# Patient Record
Sex: FEMALE | Race: WHITE
[De-identification: names, ages, dates, MRNs, and addresses within clinical notes are randomized per-mention and may not be internally consistent; named-entity substitution may affect disease eponyms.]

---

## 2019-04-18 NOTE — OR
[facility]
                                    [address], [place]  [address]
_________________________________________________________________________________________
                                                                 Signed   
 
 
DATE OF OPERATION:
04/18/2019
 
SURGEON:
Inocente Corbett MD
 
PREOPERATIVE DIAGNOSIS:
Left upper outer quadrant breast carcinoma (infiltrating ductal, ER/KY positive breast
cancer). 
 
POSTOPERATIVE DIAGNOSIS:
Left upper outer quadrant breast carcinoma (infiltrating ductal, ER/KY positive breast
cancer). 
 
PROCEDURES PERFORMED:
1. Left breast injection of methylene blue dye for sentinel lymph node identification.
2. Left deep axillary sentinel lymph node biopsy.
3. Left wire localized partial mastectomy.
 
ANESTHESIA:
General LMA; Jane Lizbeth, CRNA; and local 10 mL of 0.25% Marcaine with epinephrine.
 
INDICATION:
This 68-year-old  woman underwent screening mammography where she was found to
have an abnormality in the left upper outer quadrant of the breast.  Additional imaging
studies and biopsy by Dr. Gretchen Varma confirmed the infiltrating ductal breast
carcinoma, considered ER/KY positive, HER-2/baldomero is pending.  Her Ki67 was 18% with no
sign of lymphovascular invasion.  She has no clinically palpable lesion and the axilla
is clinically negative.  Chest x-ray is negative as well.  She does have comorbidities
including diabetes type 2 as well as ongoing smoking of half pack of cigarettes a day.
After a thorough review of her options for breast cancer management, she opts for a
breast conservation approach to include lumpectomy (partial mastectomy), axillary
sentinel lymph node biopsy, possible axillary dissection depending on frozen pathology.
She understands as does her daughter the risks of bleeding, infection, recurrent disease
and a plan already in place for radiation therapy postoperatively.  Alternative of
mastectomy has been reviewed with the patient and her daughter as well. 
It is notable that she does have mother who had breast cancer, but no other family
members.  Understanding all these she wished to proceed. 
 
FINDINGS:
Good localization by the wire of the lesion in question was noted.  The lesion was in
the upper outer aspect of the left breast.  Initial excision included the wire
 
    Electronically Signed By: INOCENTE CORBETT MD  04/18/19 1556
_________________________________________________________________________________________
PATIENT NAME:     KIANA ARSHAD                    
MEDICAL RECORD #: [id number]            OPERATIVE REPORT              
          ACCT #: [id number]  
DATE OF BIRTH:   [birth year]            REPORT #: [id number]      
PHYSICIAN:        INOCENTE CORBETT MD                 
PCP:              ANTHONY ANTHONY                  
REPORT IS CONFIDENTIAL AND NOT TO BE RELEASED WITHOUT AUTHORIZATION
 
 
                                  [facility]
                                    [address], [place]  [address]
_________________________________________________________________________________________
                                                                 Signed   
 
 
surrounding soft tissue and palpable abnormality at the tip of the wire.  Palpation of
additional tissue in the distal margin was suspicious.  Therefore, wide additional
excision was undertaken as well.  Specimen radiograph confirmed the initial lesion on
the edge of the excised specimen, concordant to our clinical findings.  I believe the
margin to be widely negative at this point. 
 
As regard to the sentinel lymph node was easily identified with both radionuclide and
methylene blue dye.  Frozen pathology showed no sign of metastatic disease in the lymph
node.  There were no other lymph nodes.  Of note, and only one sentinel lymph node was
excised. 
 
DESCRIPTION OF PROCEDURE:
The patient received from radiology suite, taken the operating room, and given a general
anesthetic by LMA technique.  Preoperative antibiotic Ancef was given.  Sequential
compression device stockings used and heparin subcutaneously administered.  The wire
emanated from the upper outer aspect of the left breast superiorly oriented.  Review of
her films including sentinel lymph node films as well as specimen.  Her as well as the
mammogram films confirmed the lesion and its position in relation to the wire and so on. 
 
1.5 mL of methylene blue dye was injected in the subepithelial space in the upper outer
aspect of the left areola.  After sterile preparation and draping, a C-Trak gamma probe
was applied to the left axilla showing strong uptake in the superior medial aspect.  A
small transverse incision was made there and dissection was carried through the
subcutaneous tissue with electrocautery.  Using blunt dissection in an avascular field,
blue lymphatics were immediately identified and they were followed to the subpectoral
area where a large blue node was noted.  It was approximately 1-1/2 to 2 cm in size.  It
was clearly the index sentinel lymph node.  This was dissected free and lymph node
dissected free and passed for pathology.  Additional interrogation of the axilla showed
no strong uptake to radionuclide monitoring and no obvious blue lymph node elsewhere.
The wound was then packed with gauze awaiting frozen pathology results. 
 
Attention was turned towards the breast area.  The lesion was nonpalpable of course and
hence the wire localization to allow for excision.  A curvilinear incision was made over
the area anticipated to be the site of the lesion in question.  Dissection was carried
through the dermis with electrocautery.  A flap was elevated superiorly to allow for
delivery of localizing wire.  The parenchyma and wire were secured with an Allis clamp
and wide resection undertaken.  The deep lateral aspect of the tip of the wire could be
palpated and excision undertaken.  The explanted breast tissue with wire allowed for
palpation confirming a high probability of lesion in question.  This was sent for
specimen radiograph.  Additional palpation in the area of excision, however, did
demonstrate additional firm tissue and very likely this would have been a positive
margin.  On that basis, wide excision was additionally undertaken in the deep and
 
    Electronically Signed By: INOCENTE CORBETT MD  04/18/19 1556
_________________________________________________________________________________________
PATIENT NAME:     KIANA ARSHAD                    
MEDICAL RECORD #: [id number]            OPERATIVE REPORT              
          ACCT #: [id number]  
DATE OF BIRTH:   [birth year]            REPORT #: [id number]      
PHYSICIAN:        INOCENTE CORBETT MD                 
PCP:              ANTHONY ANTHONY                  
REPORT IS CONFIDENTIAL AND NOT TO BE RELEASED WITHOUT AUTHORIZATION
 
 
                                  [facility]
                                    [address], [place]  [address]
_________________________________________________________________________________________
                                                                 Signed   
 
 
lateral aspects widely excised breast tissue to include the abnormally palpable tissue.
Additional examination showed no suspicious lesion whatsoever.  Irrigation was
undertaken with sterile water.  The parenchyma was reapproximated with interrupted 2-0
Vicryl.  Specimen radiograph confirmed that the lesion was consider (on the edge of the
excision) as was concordant to the clinical findings.  I did not send the additional
excised tissue for specimen radiograph. 
 
By this point, the frozen pathology of the sentinel lymph node showed no evidence of
metastatic disease.  Both wounds were copiously irrigated with sterile water for its
tumor lytic effect and deep soft tissue reapproximated with interrupted 2-0 Vicryl and
running subcuticular 3-0 Vicryl for the skin.  Steri-Strips were applied as was a
Mepilex silver sponge dressing and an OpSite.  The patient was ultimately extubated and 
transferred to recovery in good condition having suffered no complications.  Sponge,
needle, and counts reported as correct x3. 
 
 
 
            ________________________________________
            MD ONESIMO Alatorre/MODL
Job #:  [id number]
DD:  04/18/2019 13:55:29
DT:  04/18/2019 15:45:59
 
cc:            Gretchen Varma MD 
 
               [facility]
 
 
Copies:  GRETCHEN VARMA MD
~
 
 
 
 
 
 
 
 
 
 
    Electronically Signed By: INOCENTE CORBETT MD  04/18/19 1556
_________________________________________________________________________________________
PATIENT NAME:     KIANA ARSHAD                    
MEDICAL RECORD #: [id number]            OPERATIVE REPORT              
          ACCT #: [id number]  
DATE OF BIRTH:   [birth year]            REPORT #: [id number]      
PHYSICIAN:        INOCENTE CORBETT MD                 
PCP:              ANTHONY ANTHONY                  
REPORT IS CONFIDENTIAL AND NOT TO BE RELEASED WITHOUT AUTHORIZATION

## 2019-04-18 NOTE — NUR
PT RESTING IN BED, EYES CLOSED. PT EASILY AWAKENS, DENIES PAIN. ASSESSMENT
COMPLETE.
NO NAUSEA AT
THIS TIME. BOWEL TONES ACTIVE. MEPLIEX DRESSING INTACT, VERY SMALL SCANT
DRAINAGE NOTED TO AXILLARY. PT DENIES ADDITIONAL NEEDS, CALL LIGHT IN REACH.

## 2019-04-18 NOTE — NUR
ASSISTED TO BR VOIDS 150 MLS. FEELS LIGHT HEADED AND THEN BECAME NAUSEATED
VOMITED APPRO 100MLS RED LIQUID. HAD RED JELLO AND WATER 200MLS. RETURNED TO
BED.

## 2019-04-18 NOTE — NUR
PT RESTING IN BED WITH EYES CLOSED. PT RESPONDS TO VERBAL COMMAND AND ANSWERS
QUESTIONS APPROPRIATELY. PT DENIES NAUSEA AND RATES PAIN 4/10 AS COMFORTABLE.
PT FAMILY AT BEDSIDE, CALL LIGHT WITHIN REACH.

## 2019-04-18 NOTE — NUR
CONTS TO FEEL LIGHT HEADED. DENIES NAUSEA. DR CORBETT CALLED WITH UPDATE ON
LIGHT HEADEDINESS NAUSEA. TO OBS PT LONGER AND CALL HIM WITH UPDATE THIS
EVENING.

## 2019-04-18 NOTE — NUR
ASSISTED PT TO THE RESTROOM 2PA WITH RENETTA ROLLE BECAUSE PT REPORTED FEELING
"A LITTLE DIZZY". PT IS BACK IN BED AT THIS TIME. SARIAH IS IN ROOM WITH PT.

## 2019-04-18 NOTE — NUR
DR CORBETT PROVIDED WITH PT UPDATE. PT A/OX4, RATES PAIN 0-2. DENIES NAUSEA,
TOLERATING SUGAR FREE JELLO. BOWEL TONES ACTIVE. RECENT OUTPUT OF 150MLS. PT
1-2PA, REPORTED MILD DIZZINESS/WEAKNESS. VS BEFORE AND AFTER AMBULATION
STABLE. DR CORBETT AWARE OF ALL FINDINGS, PT WILL BE INPATIENT FOR EVENING AND
WILL BE EVALUATED INN THE MORNING BY DR CORBETT. AWAITING NEW ORDERS FOR PT.

## 2019-04-18 NOTE — NUR
REPORT RECEIVED FROM RENETTA VIEIRA. THIS RN ASSUMING CARE OF PT. PT REPORTS 2/10
PAIN AND DENIES NAUSEA AT THIS TIME. DRESSING SHOWS ERASER SIZED RED SPOT NEAR
AXILLA, OTHERWISE, C/D/I. WARM BLANKET PROVIDED. PT WATCHING TV. NO ADDITIONAL
REQUESTS OR COMPLAITNS AT THIS TIME.

## 2019-04-18 NOTE — NUR
THIS RN TO BEDSIDE TO CHECK ON PT. PT DENIES PAIN AND NAUSEA AT THIS TIME. PT
WATCHING TV WITH DAUGHTER. PT TOLREATING SIPS OF WATER. NO ADDTIONAL REQUESTS
OR COMPLAINTS AT THIS TIME. CALL LIGHT WITHIN REACH.

## 2019-04-18 NOTE — NUR
04/18/19 1406 Susana Araujo SN
1344- PT ARRIVES TO PACU. PT ON 6L VIA MASK. 02 SAT IN HIGH 90'S,
RESPS EVEN AND UNLABORED. PT DROWSY AND UNABLE TO RESPOND TO QUESTIONS
AT THIS TIME. ON ARRIVAL PT SBP 86. OZZY, CRNA NOTIFIED OF THIS.
ORDERED TO COMPLETE BOLUS OF FLUID AND START ANOTHER LITER.
 
1351- CBG TAKEN BY RENETTA PITTMAN AT THIS TIME. BLOOD SUGAR .
1358- 02 REMOVED AT THIS TIME. PT ON RA WITH 02 SATS IN HIGH 90'S.
TOLERATING WELL. PT DENIES PAIN, NAUSEA, AND DIZZINESS AT THIS TIME.

## 2019-04-18 NOTE — NUR
CLARIFIED NEW ORDERS WITH DR CORBETT AT NURSE'S STATION. DR CORBETT NOTIFIED THAT
PT TAKES SCHEDULED LANTUS IN THE EVENING AND IN THE EMAR LANTUS IS SCHEDULED
FOR THE MORNING. PER DR CORBETT, "YOU CAN GIVE HER THE LANTUS TONIGHT IF THAT'S
WHEN SHE TAKES IT AT HOME". THIS RN VERIFIED WITH PT AND PT'S DAUGHTER THAT PT
TAKES LANTUS AT NIGHT AND WAS LAST GIVEN ON 4/17/19 AT [place] [phone].
VERBAL ORDER VERIFIED BY DR CORBETT TO CHANGE LANTUS TIME
FOR EVENING STARTING NOW AND TO "HOLD 2AM REGULAR INSULIN AND LET THE LANTUS
TAKE ITS COURSE". NO FURTHER ORDERS.

## 2019-04-18 NOTE — NUR
SHIFT REPORT RECEIVED FROM ELEAZARHIANTONIO BOWLING AT BEDSIDE.PT RESTING IN BED,
RR WNL. MEPILEX IN PLACE, SCANT DRAINAGE NOTED AT AXILLARY, WILL MONITOR. PT
DENIES NAUSEA, SUGAR FREE JELLO GIVEN. PT REPORTS MINIMAL PAIN WITH MOVEMENT,
DENIES NEED FOR PAIN MEDICATION. FAMILY AT BEDSIDE. CALL LIGHT IN REACH.

## 2019-04-18 NOTE — NUR
1435: PATIENT BACK IN DAY SURGERY ROOM. PATIENT DROWSY. AWAKENS TO VOICE.
UNABLE TO RATE PAIN, BUT C/O PAIN. GRIMACING INTERMITTENTLY. VS CHECKED. LEFT
BREAST DRESSING WITH SCANT AMOUNT OF DRAINAGE. SCDs ON ON. IV SITE WNL.
DAUGHTER AT BEDSIDE.
1455: DR. CORBETT NOTIFIED OF PATIENT'S BP. DAUGHTER AND PATIENT REQUESTED
SOMETHING IV FOR PAIN. NEW ORDER RECEIVED FROM DR. CORBETT. PATIENT MEDICATED
FOR PAIN WITH IV MORPHINE. BP RECHECKED.
1540: ASSISTED PATIENT TO EAT JELLO AND DRINK SOME WATER. THEN MEDICATED FOR
PAIN WITH 1 TAB OF PERCOCET. VS CHECKED. FAMILY AT BEDSIDE. BREAST CARE
SPECIALIST IN TO SEE PATIENT AND DAUGHTER. CALL LIGHT WITHIN REACH.

## 2019-04-18 NOTE — EKG
[facility]
                                    [address]
                                  [place]  [address]
_________________________________________________________________________________________
                                                                 Signed   
 
 
Normal sinus rhythm
Left ventricular hypertrophy with repolarization abnormality
Prolonged QT
Abnormal ECG
When compared with ECG of 15-APR-2019 12:00,
T wave inversion no longer evident in Inferior leads
Confirmed by SHANEKA MEDELLIN DO ([id number]) on 4/18/2019 4:32:09 PM
 
 
 
 
 
 
 
 
 
 
 
 
 
 
 
 
 
 
 
 
 
 
 
 
 
 
 
 
 
 
 
 
 
 
 
 
 
 
    Electronically Signed By: SHANEKA MEDELLIN DO  04/18/19 1632
_________________________________________________________________________________________
PATIENT NAME:     KIANA ARSHAD                    
MEDICAL RECORD #: [id number]                     Electrocardiogram             
          ACCT #: [id number]  
DATE OF BIRTH:   [birth year]                                       
PHYSICIAN:   SHANEKA MEDELLIN DO                     REPORT #: [id number]
REPORT IS CONFIDENTIAL AND NOT TO BE RELEASED WITHOUT AUTHORIZATION

## 2019-04-18 NOTE — NUR
1745: PT TO ROOM 117, REPORT RECEIVED FROM YOLIS JACKSON. PT STATES HER PAIN IS
WELL CONTROLLED AT A 2/10. SHE STATES SHE CONTINUES TO FEEL A LITTLE BIT
DIZZY. PT INSTRUCTED TO NOT GET UP WITHOUT HELP AND SHE WAS ORIENTED TO THE
ROOM AND THE USE OF THE CALL BELL. LEFT BREAST SURGICAL DRESSING REMAINS
INTACT WITH A SCANT AMOUNT OF NOTED SHADOWING. SCD'S ON AND RUNNING AT THIS
TIME. 20 GAUGE RIGHT AC IV CDI WITH LR RUNNING AT 85 ML/ HOUR.

## 2019-04-19 NOTE — NUR
DC INSTUCTIONS GIVEN WITH GOOD UNDERSTANDING STATED. PT GETTING DRESSED FOR DC
AT THIS TIME. IV DC'D, TIP INTACT.

## 2019-04-19 NOTE — NUR
PT RESTING IN BED, RR WNL. IV FLUIDS INFUSING PER MD ORDERS, SITE WNL. EYES
CLOSED, NO DISTRESS NOTED. CALL LIGHT IN REACH, PT'S DAUGHTER IN ROOM RESTING
ON COUCH.

## 2019-04-19 NOTE — NUR
PT GETTING READY FOR A SHOWER AT THIS TIME. THE CNA IS IN THE ROOM ASSISTING
THE PT AT THIS TIME. PT DENIES ANY PAIN OR OTHER PROBLEMS.

## 2019-04-19 NOTE — NUR
ASSESSMENT COMPLETE. NO NEW CONCERNS. VSS. PT A/O, REPOSRTS MINIMAL
INTERMITTENT PAIN WITH MOVEMENT, DENIES NEED FOR PHARMACOLOGICAL INTERVENTION.
PT DENIES NAUSEA. IV FLUIDS INFUSING PER MD ORDERS, SITE WNL. PT RECENTLY UP
1PA FROM RESTROOM TO VOID. PT NOW RESTING IN BED, SCD'S IN PLACE. MEPILEX IN
PLACE AND INTACT, NO NEW DRAINAGE NOTED FROM PREVIOUS ASSESSMENT. NO
ADDITIONAL NEEDS, CALL LIGHT IN REACH.

## 2019-04-19 NOTE — NUR
DID PATIENT'S BLOOD SUGAR CHECK FOR BREAKFAST AND LUNCH. GETTING HER SOME ICE
WATER. SHE IS SITTING UP IN HER CHAIR DOING ARM AND LEG EXERCISES. WHILE
WATCHING TV.

## 2019-04-19 NOTE — NUR
HELPED PT TO THE BATHROOM AND BACK TO BED. VITALS DONE AND CHARTED. BEDSIDE
TABLE AND CALL LIGHT IN REACH.

## 2019-04-19 NOTE — NUR
PT HAD UNEVENTFUL NIGHT, SLEPT FOR MOST OF THE SHIFT. PT BECAME INPATIENT
AFTER GIVEN DR CORBETT UPDATE. NO NAUSEA THIS SHIFT, PT ON ADA DIET. SCHEDULED
BS CHECKS WITH INSULIN SS AND SCHEDULED LANTUS. PT SBA WITH AMBULATION. IV
FLUIDS INFUSING, SITE WNL. PT USES CALL LIGHT APPROPERIATELY.

## 2019-04-19 NOTE — NUR
PATIENT SITTING UP IN CHAIR. RN IN ROOM. FINAL VITAL SIGNS WERE OBTAINED PRIOR
TO DISCHARGE FROM THE UNIT

## 2019-04-19 NOTE — NUR
0725: Pt resting in her bed with no complaints of nausea, dizziness, or pain.
Pt alert and oriented and is ordering breakfast at this time. Call light is
within reach.

## 2019-04-19 NOTE — NUR
PER DR CORBETT (SEE PREVIOUS NOTE)
OKAY TO SKIP 0200 INSULIN SS. LAST BS BEFORE LANTUS
ADMINISTRATION . CHARGE RN AWARE OF CONVERSATION BETWEEN THIS RN AND DR CORBETT AND AGREES WITH PLAN TO HOLD 0200 INSULIN SS ORDERS. WILL MONITOR FOR
SIGNS OF HYPER/HYPOGLYCEMIA.

## 2020-05-27 ENCOUNTER — HOSPITAL ENCOUNTER (EMERGENCY)
Dept: HOSPITAL 46 - ED | Age: 70
Discharge: ANOTHER HOSPITAL | End: 2020-05-27
Payer: MEDICARE

## 2020-05-27 VITALS — WEIGHT: 155.01 LBS | BODY MASS INDEX: 28.52 KG/M2 | HEIGHT: 62 IN

## 2020-05-27 DIAGNOSIS — R57.8: ICD-10-CM

## 2020-05-27 DIAGNOSIS — Z79.899: ICD-10-CM

## 2020-05-27 DIAGNOSIS — Z79.82: ICD-10-CM

## 2020-05-27 DIAGNOSIS — F17.200: ICD-10-CM

## 2020-05-27 DIAGNOSIS — K92.2: Primary | ICD-10-CM

## 2020-05-27 DIAGNOSIS — Z79.4: ICD-10-CM

## 2020-05-27 DIAGNOSIS — E11.9: ICD-10-CM

## 2020-05-27 PROCEDURE — P9016 RBC LEUKOCYTES REDUCED: HCPCS

## 2020-05-27 PROCEDURE — C9113 INJ PANTOPRAZOLE SODIUM, VIA: HCPCS

## 2020-05-28 NOTE — EKG
[facility]
                                    [address]
                                  [place]  [address]
_________________________________________________________________________________________
                                                                 Signed   
 
 
Sinus tachycardia
ST \T\ T wave abnormality, consider lateral ischemia Abnormal ECG When compared with ECG
of 18-APR-2019 09:51,
ST more depressed in Anterior leads
T wave inversion no longer evident in Anterior leads
Confirmed by CARLY SUE MD ([id number]) on 5/28/2020 1:12:06 PM
 
 
 
 
 
 
 
 
 
 
 
 
 
 
 
 
 
 
 
 
 
 
 
 
 
 
 
 
 
 
 
 
 
 
 
 
 
 
 
    Electronically Signed By: CARLY SUE MD  05/28/20 1312
_________________________________________________________________________________________
PATIENT NAME:     KIANA ARSHAD                    
MEDICAL RECORD #: [id number]                     Electrocardiogram             
          ACCT #: [id number]  
DATE OF BIRTH:   [birth year]                                       
PHYSICIAN:   CARLY SUE MD           REPORT #: [id number]
REPORT IS CONFIDENTIAL AND NOT TO BE RELEASED WITHOUT AUTHORIZATION

## 2022-04-20 ENCOUNTER — HOSPITAL ENCOUNTER (INPATIENT)
Dept: HOSPITAL 46 - ED | Age: 72
LOS: 16 days | Discharge: HOME | DRG: 393 | End: 2022-05-06
Attending: INTERNAL MEDICINE | Admitting: INTERNAL MEDICINE
Payer: MEDICARE

## 2022-04-20 VITALS — HEIGHT: 62 IN | BODY MASS INDEX: 32.33 KG/M2 | WEIGHT: 175.71 LBS

## 2022-04-20 DIAGNOSIS — R65.10: ICD-10-CM

## 2022-04-20 DIAGNOSIS — K55.039: ICD-10-CM

## 2022-04-20 DIAGNOSIS — F17.200: ICD-10-CM

## 2022-04-20 DIAGNOSIS — E11.9: ICD-10-CM

## 2022-04-20 DIAGNOSIS — Z79.82: ICD-10-CM

## 2022-04-20 DIAGNOSIS — Z85.3: ICD-10-CM

## 2022-04-20 DIAGNOSIS — Z20.822: ICD-10-CM

## 2022-04-20 DIAGNOSIS — K56.7: ICD-10-CM

## 2022-04-20 DIAGNOSIS — I81: ICD-10-CM

## 2022-04-20 DIAGNOSIS — Z90.89: ICD-10-CM

## 2022-04-20 DIAGNOSIS — Z79.899: ICD-10-CM

## 2022-04-20 DIAGNOSIS — K76.6: ICD-10-CM

## 2022-04-20 DIAGNOSIS — K75.4: ICD-10-CM

## 2022-04-20 DIAGNOSIS — K74.60: ICD-10-CM

## 2022-04-20 DIAGNOSIS — Z79.4: ICD-10-CM

## 2022-04-20 DIAGNOSIS — K55.019: Primary | ICD-10-CM

## 2022-04-20 PROCEDURE — P9047 ALBUMIN (HUMAN), 25%, 50ML: HCPCS

## 2022-04-20 PROCEDURE — A9270 NON-COVERED ITEM OR SERVICE: HCPCS

## 2022-04-20 PROCEDURE — C9113 INJ PANTOPRAZOLE SODIUM, VIA: HCPCS

## 2022-04-20 PROCEDURE — 02HV33Z INSERTION OF INFUSION DEVICE INTO SUPERIOR VENA CAVA, PERCUTANEOUS APPROACH: ICD-10-PCS | Performed by: INTERNAL MEDICINE

## 2022-04-20 PROCEDURE — C9803 HOPD COVID-19 SPEC COLLECT: HCPCS

## 2022-04-20 PROCEDURE — C1751 CATH, INF, PER/CENT/MIDLINE: HCPCS

## 2022-04-20 PROCEDURE — U0003 INFECTIOUS AGENT DETECTION BY NUCLEIC ACID (DNA OR RNA); SEVERE ACUTE RESPIRATORY SYNDROME CORONAVIRUS 2 (SARS-COV-2) (CORONAVIRUS DISEASE [COVID-19]), AMPLIFIED PROBE TECHNIQUE, MAKING USE OF HIGH THROUGHPUT TECHNOLOGIES AS DESCRIBED BY CMS-2020-01-R: HCPCS

## 2022-04-20 NOTE — NUR
PT ARRIVED TO THE CCU AT 2320 DROWSY BUT AWOKE EASILY TO VOICE. PT LR BOLUS
INFUSING AS ORDERED. PT MOVED OVER TO THE CCU BED WITH ASSISTANCE FROM RENETTA CARSON AND HOUSE SUPERVISOR GUILLE. VITALS THEN TAKEN, ADMISSION COMPLETED. RENETTA CARSON ADMINISTERED SCHEDULED MEDICATIONS (SEE MAR). PT THEN ASSESSED PT NOTED
TO BE SLOW TO RESPOND, PT HARD OF HEARING AND DOES NOT HAVE HER HEARING AIDS.
PT FORGETFUL AND DROWSY BUT IS ORIENTED X 4. HEART RYTHM TACHYCARDIC BUT
REGULAR, LUNGS CLEAR IN UPPER LOBES, DIMINISHED IN THE BASES BILATERALLY WITH
FINE CRACKLES IN THE LEFT LOWER LOBE. BOWEL TONES HYPOACTIVE, TENDER UPON
PALPATION, PT GUARDING ABDOMEN WHEN PALPATING. RADIAL AND PEDAL PULSES STRONG,
PT DENIES HAVING ANY NUMBNESS OR TINGLING WHEN ASKED. AFTER ASSESSMENT PT
STATED SHE NEEDED TO VOID. PT ASSISSTED UP AND WAS ABLE TO PIVOT TO THE
BEDSIDE COMMODE, VOIDED 600ML. SAMPLE COLLECTED AND SENT TO THE LAB. WHILE ON
THE COMMODE PT BECAME NAUSEAUS, PRN ZOFRAN ADMINISTERED BY RENETTA CARSON. PT ABLE
TO GET BACK INTO BED WITH ASSISTANCE. PT NOW LAYING IN BED SLEEPING AND
REPORTS NO FURTHER NEEDS WHEN ASKED. CALL LIGHT IN REACH, BED IN LOWEST
POSITION, IVF INFUSING AS ORDERED, WILL CONTINUE PLAN OF CARE.

## 2022-04-20 NOTE — EKG
[facility]
                                    [address]
                                  [place]  [address]
_________________________________________________________________________________________
                                                                 Signed   
 
 
Sinus tachycardia
Left ventricular hypertrophy with repolarization abnormality ( Sokolow-Cardozo )
Abnormal ECG
When compared with ECG of 27-MAY-2020 16:29,
T wave inversion now evident in Inferior leads
Confirmed by CARLY SUE MD ([id number]) on 4/20/2022 6:25:25 PM
 
 
 
 
 
 
 
 
 
 
 
 
 
 
 
 
 
 
 
 
 
 
 
 
 
 
 
 
 
 
 
 
 
 
 
 
 
 
 
    Electronically Signed By: CARLY SUE MD  04/20/22 1825
_________________________________________________________________________________________
PATIENT NAME:     KIANA ARSHAD                    
MEDICAL RECORD #: [id number]                     Electrocardiogram             
          ACCT #: [id number]  
DATE OF BIRTH:   [birth year]                                       
PHYSICIAN:   CARLY SUE MD           REPORT #: [id number]
REPORT IS CONFIDENTIAL AND NOT TO BE RELEASED WITHOUT AUTHORIZATION

## 2022-04-20 NOTE — EKG
[facility]
                                    [address]
                                  [place], [place]  [address]
_________________________________________________________________________________________
                                                                 Signed   
 
 
Sinus tachycardia
Left ventricular hypertrophy with repolarization abnormality ( Sokolow-Cardozo )
Abnormal ECG
When compared with ECG of 20-APR-2022 17:08, (Unconfirmed)
No significant change was found
Confirmed by CARLY SUE MD ([id number]) on 4/20/2022 6:25:29 PM
 
 
 
 
 
 
 
 
 
 
 
 
 
 
 
 
 
 
 
 
 
 
 
 
 
 
 
 
 
 
 
 
 
 
 
 
 
 
 
    Electronically Signed By: CARLY SUE MD  04/20/22 1825
_________________________________________________________________________________________
PATIENT NAME:     KIANA ARSHAD                    
MEDICAL RECORD #: [id number]                     Electrocardiogram             
          ACCT #: [id number]  
DATE OF BIRTH:   [birth year]                                       
PHYSICIAN:   CARLY SUE MD           REPORT #: [id number]
REPORT IS CONFIDENTIAL AND NOT TO BE RELEASED WITHOUT AUTHORIZATION

## 2022-04-21 NOTE — NUR
PT LAYING IN ROOM AT THIS TIME AWAKE AND ALERT, DAUGHTER AT THE BEDSIDE, IVF
INFUSING AS ORDERED.  FINISHED DRAWING PT'S LABS AT THIS TIME, BLOOD
BAND VERIFIED WITH . HEPARIN BOLUS OF 5000 UNITS AND HEPARIN DRIP
CALCULATED AT A RATE OF 1100 UNITS/HR DOUBLE VERIFIED WITH RENETTA FORTUNE.
MEDICATIONS THEN ADMINISTERED (SEE MAR). HEPARIN DRIP NOW INFUSING AT 1100
UNITS/HR. PT REPORTS NO NEEDS AT THIS TIME WHEN ASKED AND IS RESTING IN BED
WITH HER DAUGHTER AT THE BEDSIDE. CALL LIGHT IN REACH, BED IN LOWEST POSITION,
WILL CONTINUE PLAN OF CARE.

## 2022-04-21 NOTE — NUR
Spoke with pt, her sister, and friends. Pt consent to questions with
people in room. Pt lives with her daughter, Ariadna, she is at work today.
Pt does not use any DME and has 0 issues acessing her home. Daughter
does all the shopping, cooking, and cleaning. Pt can drive.  Pt plans on
dc to home when cleared medically.

## 2022-04-21 NOTE — NUR
PATIENT RESTING IN BED WITH HER FAMILY AT THE BEDSIDE. PATIENT COMPLAINING OF
PAIN. PER PATIENT THE PAIN MEDICATION GIVEN PRIOR ISNT HELPING HER PAIN.
UPDATED ANA PAULA RN AND ANA PAULA WILL BE IN TO GIVE PRN MORPHINE. PATIENT
AGREEABLE TO PLAN OF CARE. WILL CONTINUE TO CLOSELY MONITOR.

## 2022-04-21 NOTE — NUR
MD IN TO SEE PATIENT. PATIENT IS MORE DISTENDED THIS AM PER MD. PATIENT
REMAINS VERY TENDER TO PALPATION. WILL REPEAT CT SCAN WITH CONTRAST. NO OTHER
NEEDS AT THIS TIME. WILL CONTINUE TO CLOSELY MONITOR.

## 2022-04-21 NOTE — NUR
STUDENT NURSE ARABELLA IN TO GIVE PATIENT A BEDBATH WITH RENETTA GREGORY. SKIN IS
INTACT. PATIENT UP TO CAMMODE WITH STAND-BY ASSIST. PATIENT TOLERATED WELL.
WILL CONTINUE TO CLOSELY MONITOR.

## 2022-04-21 NOTE — NUR
PT LAYING IN BED AWAKE AT THIS TIME, PT ORIENTED X4, IVF INFUSING, IV HEPARIN
INFUSING AT PREVIOUS RATE (SEE MAR). PT VITALS TAKEN AT THIS TIME (SEE CHART).
PT ASSESSMENT THEN COMPLETED. HEART RATE REGULAR, LUNGS CLEAR IN UPPER LOBES
AND CLEAR/DIMINISHED IN THE BASES. ABDOMEN DISTENDED, PAINFUL WHEN PALPATED,
AND GUARDED UPON PALPATION. PT REPORTS 9/10 ABDOMINAL PAIN WHEN ASKED. CMS
INTACT, RADIAL AND PEDAL PULSES STRONG, PT DENIES N&T TO EXTREMITIES.
SCHEDULED MEDICATIONS ADMINISTERED AT THIS TIME ALONG WITH PRN MORPHINE 2MG
FOR ABDOMINAL PAIN (SEE MAR). SLIDING SCALE INSULIN HELD AS CBG WAS
127. PT NOW RESTING IN BED AND REPORTS NO FURTHER NEEDS WHEN ASKED. CALL LIGHT
IN REACH, BED IN LOWEST POSITION, PT'S DAUGHTER AT THE BEDSIDE, WILL CONTINUE
PLAN OF CARE.

## 2022-04-21 NOTE — NUR
PATIENT SHIFT REPORT RECIEVED FROM NIGHT SHIFT RN. PATIENT RESTING IN BED AT
THIS TIME. PER REPORT PATIENT IS PAINFUL ALL OVER. PATIENT HAS ALSO BEEN
TACHYCARDIC IN THE 'S. PATIENT IS GETTING UP AND USING BEDSIDE CAMMODE.
NO OTHER NEEDS AT THIS TIME. WILL CONTINUE TO CLOSELY MONITOR.

## 2022-04-21 NOTE — NUR
PATIENT TOLERATED CT SCAN WELL. PATIENTS DAUGHTER AT THE BEDSIDE. REVIEWED
PLAN OF CARE WITH PATIENT AND HER DAUGHTER. PATIENTS ASSESSMENT ALSO COMPLETED
AT THIS ITME. PATIENTS ABD CONTINUES TO BE DISTENDED AND TENDER. PATIENTS
BOWEL TONES ARE HYPOACTIVE. WILL CONTINUE TO CLOSELY MONITOR PATIENT.

## 2022-04-21 NOTE — NUR
assisted patient in taking her scheduled medications by mouth she did well,
patient appears to be very thirsty, drank almost a whole cup of water. took
her emulose 15 ml well. wants to go back to sleep.

## 2022-04-21 NOTE — NUR
PATIENTS ASSESMENT COMPLETED. PATIENTS BREATH SOUNDS CLEAR, RR RATE EVEN AND
UNLABORED. BOWEL TONES ACTIVE, PATIENT IS VERY TENDER TO PALPATION, MILD
ABD DISTENTION, NO N/V. PATIENT WAS ABLE TO EAT APPROX 50% OF HER CLEAR LIQUID
TRAY. PULSESS GOOD AND STRONG. PATIENT IS GERNALLY WEAK AND ACHY ALL OVER. PT
IS ALERT AND ORIENTED, BUT A LITTLE HARD OF HEARING. MEDICATIONS GIVEN AND
PATIENT TOOK WITH NO ISSUES. AM CARES PROVIDED. CALL LIGHT IN REACH. WILL
CONTINUE TO CLOSELY MONITOR.

## 2022-04-21 NOTE — NUR
MD CORBETT IN THE UNIT. PER MD GIVE A 5000UNIT HEPARIN BOLUS AND START HEPARIN
GTT. UPDATED MD SUE. MD IS AGREEABLE WITH PLAN OF CARE. AWAITED PHARMACY TO
VERIFY MEDS. STARTED GTT PER ORDERS WITH STEPHANIE JACKSON. WILL CONTINUE TO CLOSELY
MONITOR.

## 2022-04-21 NOTE — NUR
PT LAYING IN BED SLEEPING AT THIS TIME IN NO APPARENT DISTRESS. IVF INFUSING
AT ORDERED RATE. RESPIRATIONS EVEN AND UNLABORED, PT IN NO APPARENT DISTRESS
AND WAS LEFT UNDISTURBED. CALL LIGHT IN REACH, BED IN LOWEST POSITION, WILL
CONTINUE PLAN OF CARE.

## 2022-04-21 NOTE — NUR
MD SUE IN TO DISCUSS CT RESULTS WITH PATIENT AND HER DAUGHTER. PLAN OF CARE
REVIEWED WITH BOTH OF THEM. ALL QUESTIONS ANSWERED. PER MD WILL START PATIENT
ON A HEPARIN GTT. PATIENT THEN UP TO BEDSIDE CAMMODE WITH STAND-BY ASSIST AND
TOELRATED WELL. PATIENT RATING ABD PAIN 9/10. PRN OXYCODONE ADMINISTERED. NO
OTHER NEEDS AT THIS TIME. PATIENTS SIST AT THE BEDSIDE AND HER DAUGHTER
STEPPED OUT TO GRAB SOME BELONGINGS TO STAY WITH THE  PATIENT TONIGHT. CALL
LIGHT IN REACH. WILL CONTINUE TO CLOSELY MONITOR.

## 2022-04-21 NOTE — NUR
MEDICATIONS ADMINISTERED INCLUDING 2 MG IV MORPHINE FOR PAIN (SEE EMAR).
FAMIILY AT BEDSIDE. ANSWERED QUESTIONS ABOUT PLAN OF CARE. CALL LIGHT WITHIN
REACH. WILL CONTINUE TO MONITOR.

## 2022-04-21 NOTE — NUR
PT LAYING IN BED SLEEPING AT THIS TIME HEPARIN AND IVF INFUSING AT PREVIOUS
RATES. SCHEDULED IV ABX STARTED AND INFUSING AT ORDERED RATE (SEE MAR). PT
AWOKE DURING THIS TIME. PT ASKED IF SHE WAS HAVING PAIN, PT STATED YES AND
THAT SHE WAS HAVING ABDOMINAL PAIN 8/10. 2MG PRN MORPHINE THEN ADMINISTERED
(SEE MAR). PT REPORTED NO FURTHER NEEDS AT THIS TIME AND RETURNED TO SLEEP. PT
RESTING IN BED, PT'S DAUGHTER IN ROOM SLEEPING IN THE RECLINER, CALL LIGHT IN
REACH, BED IN LOWEST POSITION, WILL CONTINUE PLAN OF CARE.

## 2022-04-22 NOTE — NUR
UPDATED MD ON PATIENTS PAIN CONTROLL. PER PATIENT THE OXYCODONE WORKED WELL
FOR HER ON THE PRIOR DOSE. PATIENT DENIES ANY HALLUCINATIONS SINCE THIS AM.
PER MD WILL CHANGE OXYCODONE TO EVERY 3 HOURS AS NEEDED. WILL CONTINUE TO
CLOSELY MONITOR. MATTHEW JACKSON IS AT PATIENTS BEDSIDE AT THIS TIME TO PLACE MIDLINE.

## 2022-04-22 NOTE — NUR
THIS RN IN WITH PATIENT FOR PAST HOUR. PATIENT NOW RESTING AT THIS TIME.
PATIENT STATES PAIN IS TOLERABLE AT THIS TIME. PATIENT HAS SLEPT MORE TODAY,
BUT IS EASILY WOKEN. WILL CONTINUE TO CLOSELY MONITOR.

## 2022-04-22 NOTE — NUR
PATIENTS APTT IS 71.1. PER ORDER PROTOCOL WILL KEEP HEPARIN GTT AT THE SAME
GTT RATE. VERIFIED WITH PHYLLIS JACKSON.

## 2022-04-22 NOTE — NUR
PATIENT REPOSTITIONED WITH 2 PILLOWS UNDER BUTTOCKS. CALL LIGHT IN EASY REACH.
DAUGHTER STEPPED OUT OF ROOM.

## 2022-04-22 NOTE — NUR
REPORT RECEIVED FROM DAYSHIFT RN, PT RESTING IN BED AWAKE AND ALERT, IVF AND
IV HEPARIN INFUSING AT ORDERED RATES. PT REPORTS NO NEEDS AT THIS TIME WHEN
ASKED, CALL LIGHT IN REACH, BED IN LOWEST POSITION, WILL CONTINUE PLAN OF
CARE.

## 2022-04-22 NOTE — NUR
PATIENT SHIFT ASSESSMENT COMPLETED. PATIENTS BREATH SOUNDS CLEAR AND
DIMINISHED. RR- 12. UNLABORED. OCCASIONAL COUGH NOTED. PATIENT IS ON RA WITH
SPO2 94%. BOWEL TONES HYPOACTIVE. NO BM TODAY. PATIENTS ABD IS TENDER AND
DISTENDED. PATIENT IS AO X4. GEOETN REPORTS SHE IS HAVING HALLUCINATIONS.
STUDENT NURSE AT THE BEDSIDE TO ASSIST PATIENT WITH AM CARE. NO OTHER NEEDS AT
THIS TIME. WILL CONTINUE TO CLOSELY MONITOR.

## 2022-04-22 NOTE — NUR
PT LAYING IN BED AWAKE AND THIS TIME. IVF, HEPARIN DRIP, AND IV ABX INFUSING
AS ORDERED. PT REPORTS NO NEEDS WHEN ASKED AT THIS TIME. SCHEDULED IV FLAGYL
STARTED AS ORDERED AND NOW INFUSING (SEE MAR). 85ML OF URINE OBTAINED FROM
KHAN CATHETER AT THIS TIME STILL CONCENTRATED. PT REPORTS NO NEEDS WHEN ASKED
AND STATES SHE WILL TRY TO GET SOME SLEEP. CALL LIGHT IN REACH, BED IN LOWEST
POSITION, WILL CONTINUE PLAN OF CARE.

## 2022-04-22 NOTE — NUR
PT LAYING IN BED SLEEPING AT THIS TIME IN NO APPARENT DISTRESS. IVF INFUSING
AS ORDERED. HEPARIN DRIP RATE VERIFIED WITH SECOND RN, RATE STARTED AT 1000
UNITS PER TITRATION ORDERS AFTER 30 MINUTE PAUSE. PT IN NO APPARENT DISTRESS
AND REMAINS ASLEEP. RESPIRATIONS EVEN AND UNLABORED. CALL LIGHT IN REACH, WILL
CONTINUE PLAN OF CARE.

## 2022-04-22 NOTE — NUR
PATIENTS PTT IS 75.4. THIS VALUE IS WITHIN THE RANGE THAT DOES NOT NEED
TITRATED AND WILL NOW CHECK PTT IN THE AM SINCE 2 VALUES HAVE NOW BEEN WITHIN
THE PARAMETERES OF NO CHANGES. VERIFIED WITH PHYLLIS JACKSON.

## 2022-04-22 NOTE — NUR
PATIENT RESTING IN BED. PATIENTS DAUGHTER STEPPED OUTSIDE TO WALK. LAB IN AND
LABS WERE COMPLETED. PATIENT TOLERATED WELL. PATIETNS EYES CLEANED. PATIENT
STATES HER PAIN IS BETTER CONTROLLED AT THIS TIME. PATIENT DENIES ANY FURTHER
HALLUCINATIONS. WILL CONTINUE TO CLOSELY MONITOR.

## 2022-04-22 NOTE — NUR
PT'S APTT VALUE OBTAINED AND . PT HEPARIN DRIP STOPPED PER DRIP ORDERS.
DRIP RATE TO BE CONTINUED 30 MINUTES AFTER STOP TIME AT A NEW RATE OF 1000
UNITS/HR PER TITRATION ORDERS. PT LAYING IN BED SLEEPING AT THIS TIME AND
AWOKE EASILY . PT REPORTED HAVING SOME ABDOMINAL PAIN 9/10 AND REQUESTED PRN
PAIN MEDICATION WHEN ASKED. 2MG PRN MORPHINE ADMINISTERED (SEE MAR). PT
REPORTS NO FURTHER NEEDS AND IS NOW SLEEPING. MAINTENANCE IVF INFUSING AS
ORDERED. CALL LIGHT IN REACH,  BED IN LOWEST POSITION, WILL CONTINUE PLAN OF
CARE.

## 2022-04-22 NOTE — NUR
PT LAYING IN BED SLEEPING AT THIS TIME. IVF AND IV HEPARIN INFUSING AT
PREVIOUS RATES. VITALS TAKEN AT THIS TIME, PT AWOKE DURING THIS TIME. PT THEN
ASSESSED. PT SLIGHTLY DROWSY BUT ORIENTED X3 AND FOLLOWS DIRECTIONS. PT IV
SITES C/D/I AND FLUSHING WELL. LUNGS CLEAR IN UPPER LOBES AND DIMINISHED/CLEAR
IN THE BASES BILATERALLY. BOWEL TONES ACTIVE, PAINFUL WHEN PALPATED, MILDLY
DISTENDED, AND GUARDED WHEN PALPATED. PULSES STRONG, PT DENIES NUMBNESS OR
TINGLING TO EXTREMITIES. PT ASKED IF SHE NEEDED TO VOID AT THIS TIME, PT
STATED YES. PT ASSISTED UP AND WAS ABLE TO PIVOT TO THE BEDSIDE COMMODE WITH
SOME SUPPORT. PT VOIDED 350ML OF CONCENTRATED URINE AND WAS ASSISTED BACK INTO
THE BED. PT REPORTS NO FURTHER NEEDS AND IS NOW RESTING IN BED. PHLEBOTOMIST
NOW IN ROOM TO DRAW SCHEDULED LABS, WILL CONTINUE PLAN OF CARE.

## 2022-04-22 NOTE — NUR
PATIENT SHIFT ASSESSMENT COMPLETED AND REMAINS UNCHANGED FROM PRIOR
ASSESSMENT. PATIENT RESTING IN BED WITH HER DAUGHTER VIK AT THE BEDSIDE. BS
CHECKED. WILL CONTINUE TO CLOSELY MONITOR.

## 2022-04-22 NOTE — NUR
THIS RN ASSISTED PATIENT UP TO THE BEDSIDE CAMMMODE. PATIENT IS A STAND-BY
ASSIST AND TOLERATED WELL. NO OTHER NEEDS AT THIS TIME. WILL CONTINUE TO
CLOSELY MONITOR.

## 2022-04-22 NOTE — NUR
MIDLINE INSERTION NOTE
WAS ASKED TO PLACE MIDLINE FOR POOR ACCESS. PT AND PT'S FAMILY NOTIFIED ABOUT
RISKS AND BENEFITS OF A MIDLINE AND ALL PARTIES GAVE APROVAL FOR THE MIDLINE
TO BE PLACED.
PT'S BASILIC AND BRACHIAL VEINS
WERE EXAMINED. THE BASILIC WAS CHOSEN AS IT IS AWAY FROM THE ARTERY. ACCESSED
ON THE FIRST ATTEMPT. RED NONPUSITILE BLOOD WAS RETURNED. PT TOLERATED WELL.

## 2022-04-22 NOTE — NUR
PT ALERT AND ORIENTED LAYING IN BED AT THIS TIME HEPARIN AND IVF INFUSING AT
PREVIOUS RATES. DR. SUE IN AFTER ENTERING THE ROOM TO SPEAK TO THE PT. NEW
ORDERS GIVEN AFTERWARDS TO INCREASE LR TO 150ML/HR AND INSERT A KHNA
CATHETER. CLEAR DIET ORDERS VERIFIED WITH DR. SUE AT THIS TIME AS WELL. PT
STILL RESTING IN BED AWAKE AND ALERT AND WAS INFORMED ON THE KHAN CATHETER
PLACEMENT. VITALS THEN TAKEN AND SCHEDULED MEDICATIONS ADMINISTERED (SEE MAR).
INSULIN NOT GIVEN PER SLIDING SCALE (SEE MAR). PT DENIED THE NEED FOR PAIN
MEDICATION AT THIS TIME WHEN ASKED AND REPORTS NO NAUSEA. RENETTA GUERRA IN AND
ASSISTED WITH PLACING THE KHAN CATHETER, 210 ML OF URINE OBTAINED UPON
INITIAL PLACEMENT THAT WAS OZZY/CLEAR. PT THEN REPOSITIONED UP ON THE BED,
PILLOWS PLACED UNDER PT HIPS. NEW BAG OF HEPARIN STARTED AND INFUSING AT
PREVIOUS RATE OF 1000 UNITS/HR (SEE MAR). ICE WATER PROVIDED TO THE PT. PT
REPORTS NO NEEDS AND IS RESTING IN BED WATCHING TV, CALL LIGHT IN REACH, WILL
CONTINUE PLAN OF CARE.

## 2022-04-22 NOTE — NUR
MD SUE IN TO SEE PATIENT. REVIEWED ASSESSMENT FINDINGS WITH MD AND UPDATED
ABOUT PATIENT REPORTING HALLUCINATIONS. REVIEWED HEPARIN GTT PROTOCOL WITH MD SUE. PER WRITTEN PROTOCOL WILL DO 1 MORE PTT AT 1330. PER WRITTEN PROTOCOL
MUST HAVE 2 CONSECUTIVE PTTS WITHIN NORMAL RANGE TO DO NEXT DRAW IN THE AM.
PATIETNS DAUGHTER AT THE BEDSIDE. REVIEWED PLAN OF CARE WITH BOTH. NO FURTHER
QUESTIONS AT THIS TIME. WILL CONTINUE TO CLOSELY MONITOR.

## 2022-04-22 NOTE — NUR
PATIENT SHIFT REPORT RECIEVED FROM NIGHT SHIFT RN. PATIENT RESTING IN BED.
PATIENTS DAUGHTER AT THE BEDSIDE. WILL CONTINUE TO CLSOELY MONITOR.

## 2022-04-22 NOTE — NUR
PT LAYING IN BED ALERT AND ORIENTED IVF AND HEPARIN DRIP INFUSING AT PREVIOUS
RATES. SCHEDULED IV ABX STARTED AND INFUSING AS ORDERED. VITALS THEN TAKEN AND
PATIENT ASSESSED. HEART RATE REGULAR, MURMUR STILL PRESENT, LUNGS CLEAR IN
UPPER LOBES AND DIMINISHED IN THE BASES BILATERALLY. BOWEL TONES HYPOACTIVE,
ABDOMEN DISTENDED, PT REPORTS PAIN WHEN PALPATING, GUARDING PRESENT WHEN
PALPATING ABDOMEN. PT STATED SHE NEEDED TO VOID, PT ABLE TO STAND UP AND PIVOT
WITH SUPPORT FROM THIS RN. 250ML OF CONCENTRATED URINE VOIDED, PT THEN ASSITED
BACK INTO BED. PT THEN REPORTED NAUSEA AND 9/10 ABDOMINAL PAIN, PRN ZOFRAN AND
2MG PRN MORPHINE ADMINISTERED. PT NOW RESTING IN BED AND REPORTS NO NEEDS,
CALL LIGHT IN REACH, BED IN LOWEST POSITION, PT'S DAUGHTER AT THE BEDSIDE,
WILL CONTINUE PLAN OF CARE.

## 2022-04-22 NOTE — NUR
PT LAYING IN BED SLEEPING AT THIS TIME. IVF, IV ABX, AND HEPARIN INFUSING AS
ORDERED. PT IN NO APPARENT DISTRESS AT THIS TIME, RESPIRATIONS EVEN AND
UNLABORED, PT LEFT UNDISTURBED. CALL LIGHT IN REACH. 50ML URINE OBTAINED FOR
THIS HOUR. NO FURTHER NEEDS ASSESSED AT THIS TIME, WILL CONTINUE PLAN OF CARE.

## 2022-04-23 NOTE — NUR
EVENING MEDS PROVIDED PER ORDER. DISCUSSED PAIN MANAGEMENT WITH FAMILY AND
PATIENT. MAIN COMPLAINT IS LOWER BACK PAIN WHICH PATIENT USES A HEATING PACK
ON AT HOME. K-PAD SET UP FOR PATIENT TO USE ON HER LOWER BACK PRN. PATIENT
DOSE NOT REQUIRE INSULIN COVERAGE THIS EVENING PER SSI ORDER. PATIENT DENIES
NAUSEA AT THIS TIME. ABD IS MODERATELY DISTENDED AND FIRM, TENDER TO TOUCH.
BOWEL SOUNDS HYPOACTIVE. PATIENT REPORTS PASSING GAS EARLIER TODAY. LUNG
SOUNDS ARE CLEAR, DIM IN THE BASES. TOLERATING ROOM AIR. NO EDEMA NOTED IN
LOWER EXTREMITIES. RIGHT ARM MAY HAVE SMALL AMOUNT OF SWELLING, ELEVATED ON A
PILLOW. VS STABLE. KHAN EMPTIED, GOOD OUTPUT. KHAN CARE DONE. LEG BAND
PLACED AND ADJUSTED BY PATIENT'S DAUGHTER. PATIENT REPOSITIONED, HIPS FLOATED
AND HEELS FLOATED. FRESH ICE AND SODA PROVIDED. PATIENT DENIED ANY FURTHER
NEEDS. FAMILY REMAINS AT BEDSIDE. CALL LIGHT IN REACH.

## 2022-04-23 NOTE — NUR
PT LAYING IN BED SLEEPING AT THIS TIME IVF, IV ABX, AND IV HEPARIN INFUSING AT
PREVIOUS RATES. ASSESSMENT COMPLETED AT THIS TIME (SEE CHART). BOWEL TONES
ACTIVE AT THIS TIME THROUGHOUT, ABDOMEN STILL DISTENDED, GAURDED, AND PAINFUL
UPON PALPATION. PT REPORTS PAIN TO NOW BE 8/10 AND REQUESTED PRN PAIN
MEDICATION. IV SITES AND MIDLINE SITE WNL AND FLUSHING WELL. AFTER ASSESSMENT
PRN OXYCODONE PROVIDED (SEE MAR). PT PROVIDED WITH SIPS OF ICE WATER AND
REPORTS NO FURTHER NEEDS AFTERWARDS. PT RESTING IN BED.VITALS TAKEN AND 60ML
OF URINE OBTAINED FOR THE HOUR (SEE CHART). CALL LIGHT IN REACH, BED IN LOWEST
POSITION, WILL CONTINUE PLAN OF CARE.

## 2022-04-23 NOTE — NUR
PT LAYING IN BED AWAKE AND ALERT AT THIS TIME AND ORIENTED X3. IVF AND IV
HEPARIN MOMENTARILY STOPPED. LABS DRAWN FROM MIDLINE AFTER WASTE AND SENT TO
THE LAB. HEPARIN AND FLUIDS THEN RESTARTED, IV ABX STARTED AS WELL AND NOW
INFUSING AT ORDERED RATE (SEE MAR). PTS KHAN EMTPIED AT THIS TIME OF
OZZY/YELLOW CLEAR URINE.
PT'S DAUGHTER ENTERED THE ROOM AND REPORTED THAT THE PT WAS HAVING SOME
HALLUCINATIONS EARLIER AND HAD REPORTED THAT THE CLOCK IN THE ROOM WAS MOVING
BACKWARDS. PT CURRENTLY AWAKE AND ALERT WATCHING TV AND REPORTS NO NEEDS WHEN
ASKED AND IS IN NO APPARENT DISTRESS. CALL LIGHT IN REACH, BED IN LOWEST
POSITION, WILL CONTINUE PLAN OF CARE.

## 2022-04-23 NOTE — NUR
PATIENT'S IV PUMP ALARMING. MORE VOLUME ADDED TO HERPARIN INFUSION; PROGRAMED
DOSE VERIFIED WITH EMAR. IV FLUIDS INFUSING AT 15 ML/HR WITH K+ PHOS INFUSION
AT 85 ML/HR TO TOTAL 100 ML/HR IVF ORDER. PATIENT IS RESTING WITH EYES CLOSED.
FAMILY AT THE BEDSIDE. VS STABLE. IV SITES WNL X3. FAMILY DENIED NEEDS AT THIS
TIME.

## 2022-04-23 NOTE — NUR
ZOFRAN 4 MG IV GIVEN FOR NAUSEA. CLEAR LIQ DIET HELD AT THIS TIME.
REPOSITIONED. K PHOS NOW INFUSING. KCL RIDER COMPLETE. K PHOS AND D5LR
INFUSING AT TOTAL RATE  ML/HR.

## 2022-04-23 NOTE — NUR
PT LAYING IN BED SLEEPING, IV ABX COMPLETED, IVF INFUSING, IV HEPARIN INFUSING
AT PREVIOUS RATES. SP2 MONITOR PLACED BACK ON THE PT, NEW BAG OF MAINTENANCE
IVF STARTED AND NOW INFUSING AT ORDERED RATE. URINE FOR THE HOUR WAS 130MLS.
PT IN NO APPARENT DISTRESS AT THIS TIME, RESPIRATIONS EVEN AND UNLABORED, PT
LEFT UNDISTURBED. CALL LIGHT IN REACH, WILL CONTINUE PLAN OF CARE.

## 2022-04-23 NOTE — NUR
PT LAYING IN BED SLEEPING AT THIS TIME, IVF AND HEPARIN INFUSING AT PREVIOUS
RATES. IV SITES AND MIDLINE SITE C/D/I. SITES FLUSHING EASILY, MIDLINE RETURNS
BLOOD, IVF INFUSING INTO SITE. PT ASSESSMENT COMPLETED (SEE CHART). PT AWOKE
DURING THIS TIME AND WAS ALERT AND ORIENTED. PT REPORTS 7/10 PAIN WHEN
PALPATED, ABDOMEN GUARDED, DISTENDED, BOWEL TONES ACTIVE AT THIS TIME. VITALS
TAKEN AFTER ASSESSMENT AND PRN OXYCODONE ADMINISTERED FOR PAIN (SEE MAR). PT
REPORTS PROVIDED WITH SIPS OF WATER AND REPORTS NO FURTHER NEEDS AT THIS TIME.
PT REMAINS RESTING IN BED, CALL LIGHT IN REACH, BED IN LOWEST POSITION, WILL
CONTINUE PLAN OF CARE.

## 2022-04-23 NOTE — NUR
PT CBG ASSESSED AT THIS TIME AND WAS 78. PT RESTING IN BED AT THIS TIME AND
AWOKE EASILY AND WAS ORIENTED X3. PT PROVIDED WITH JUICE AFTER ASSESSING CBG
. PT REPORTS NO FURTHER NEEDS AND IS NOW RESTING IN BED WATCHING
TV. IVF AND IV HEPARIN INFUSING, CALL LIGHT IN REACH, PT'S DAUGHTER AT THE
BEDSIDE, WILL CONTINUE PLAN OF CARE.

## 2022-04-23 NOTE — NUR
ASSESSMENT DONE. YESSICAEKD WITH PATIENT AND PATIENT DAUGHTER ABOUT POC FRO DAY.
PATIENT IS VERY DROWSY. C/O ABD PAIN.

## 2022-04-23 NOTE — NUR
K RIDER 40 MEQ HUNG  ML/HR AS DIRECTED. IVF OF D5LR HELD WHILE K RIDER
INFUSING. HEPARIN GTT CONTINUES TO INFUSE AT 1000 UNITS/HR, EQUALS 20 ML/HR.
TAKING FEW BITES OF CLEAR LIQUID LUNCH. CONTINUES TO PERIODS OF
NAUSEA. ABD DISTENDED. HOB ELEVATED SLIGHTLY. FAMILY MEMBER IN ROOM.

## 2022-04-23 NOTE — NUR
PT LAYING IN BED SLEEPING, IVF AND HEPARIN INFUSING AT PREVIOUS RATES. 105 ML
OF URINE OBTAINED FOR THE HOUR. PT IN NO APPARENT DISTRESS, RESPIRATIONS
UNLABORED, PT LEFT UNDISTURBED, WILL CONTINUE PLAN OF CARE.

## 2022-04-24 NOTE — NUR
SCHEDULED MED PROVIDED. PT WAKES TO VOICE, NO NEEDS AT THIS TIME. DAUGHTER IN
ROOM. CALL LIGHT IN REACH.

## 2022-04-24 NOTE — NUR
MORNING LABS DRAWN FROM MIDLINE. LAB IN ROOM TO LABEL SAMPLE. PATIENT RESTING
WITH EYES CLOSED. DENIES NEEDS. VS STABLE. KHAN EMPTIED. LAB RESULTS SHOW
THERIPUTIC PTT; NO TITRATION REQUIRED.

## 2022-04-24 NOTE — NUR
PATIENT PROVIDED WITH PRN PAIN MEDS AND NAUSEA MEDS WITH SCHEDULED MEDS. NO
SSI REQUIRED FOR CURRENT BLOOD GLUCOSE. PATIENT IS TOLERATING ROOM AIR. VS
STABLE. ABD IS LARGELY DISTENDED AND FIRM. BOWEL SOUNDS HYPOACTIVE. PATIENT
REPORTS HAVING A BM TODAY WITH NO RELIEF. KHAN CARE DONE, GOOD OUTPUT. IV
FLUIDS AND ABX PER ORDER, SITES WNL X3. LABS DRAWN FROM MIDLINE; BRISK BLOOD
RETURN AND EASY TO FLUSH.

## 2022-04-24 NOTE — NUR
NEW BAG OF HEPARIN STARTED, NO TITRATION. 1000 UNITS/HR. PATIENT LAYING ON
LEFT SIDE WHEN RN ENTERED. ASSISTED PATIENT TO TURN AND LAY ON HER LEFT SIDE
WITH PILLOWS TO PROVIDE SUPPORT. IV SITES WNL X3. IV FLUIDS, ABX AND HEPARIN
INFUSING. PATIENT VS STABLE. LARGE URINE OUTPUT.

## 2022-04-24 NOTE — NUR
aPTT RESULTS BACK , HEPARIN DRIP STOPPED PER PROTOCOL, PLAN TO RESTART
IN THIRTY MINUTES AT DECREASED RATE.

## 2022-04-24 NOTE — NUR
PATIENT REPOSTIIONED UP IN THE BED. VS STABLE. KHAN HAS GOOD OUTPUT. IV
FLUIDS PER ORDER, SITE WNL. PATIENT DENIED ANY FURTHER NEEDS. K-PAD ON LOW
HEAT SETTING.

## 2022-04-24 NOTE — NUR
REPORT RECIEVED. ACCUCHECK-122. DAUGHTER IS IN ROOM. PATIENT C/O ABD
DISCOMFORT. INCREASE WITH MOVEMENT. REPOSITIONED FOR COMFORT. TALKED WITH
PATIENT AND DAUGHTER ABOUT POC OF CARE FOR DAY, DAUGHTER INDICATES
UNDERSTANDING.

## 2022-04-24 NOTE — NUR
UP TO COMMODE WITH ASSIST TO EXPELL LARGE PASTY BROWN STOOL, HEMATEST
NEGATIVE. C/O INCREASED PAIN WITH MOVEMENT. SPONGE BATH GIVEN WHILE UP TO
COMMODE. BACK TO BED W/O INCIDENT.

## 2022-04-24 NOTE — NUR
PATIENT REPOSITIONED IN THE BED. VS STABLE. PATIENT REPORTS PAIN WITH MOVEMENT
AND SOME MILD NAUSEA FROM HER ABD BEING DISTENDED. STATES "ITS LIKE A TIGHT
BAND".

## 2022-04-24 NOTE — NUR
PATIENT RESTING QUIETLY IN BED. OPENS EYES TO VOICE. PATIENT DENIED NEEDS.
REPORTS IMPROVED COMFORT WITH REPOSITIONING AND K-PAD ON HER BACK. IV FLUIDS
PER ORDER, SITE WNL X3. GOOD OUTPUT. VS STABLE.

## 2022-04-24 NOTE — NUR
TOOK CLEAR LIQUID DIET WELL. IS MORE ALERT, SITTING UP IN BED WATCHING TV.
HEPARIN GTT INFUSING  UNITS/HR. IVF AT 80 ML/HR. PATIENT DAUGHTER
REMAINS IN ROOM.

## 2022-04-25 NOTE — NUR
PT LAYING IN BED AWAKE AND ALERT, HEPARIN AND IVF INFUSING AT ORDERED RATES.
VITALS TAKEN AT THIS TIME AND PT. ASSESSED. PT ORIENTED X4, DENIES NUMBNESS OR
TINGLING TO EXTREMTIES, LUNGS CLEAR IN UPPER LOBES AND DIMINISHED IN THE
BASES, HEART REGULAR IN RYTHM, MURMUR PRESENT, HYPOACTIVE BOWEL TONES PRESENT,
ABDOMEN DISTENDED, PAINFUL WHEN PALPATED AND GUARDED. PT REPORTS OVERALL PAIN
AT 7/10 TO HER ABDOMEN AND NARE DUE TO HER NG TUBE THAT IS ON LOW INTERMITTEND
SUCTION WITH BILE PRESENT IN TUBE. PULSES STRONG, MIDLINE SITE C/D/I, FLUSHES
WELL AND RETURNS BLOOD. SCHEDULED MEDICATIONS ADMINISTERED (SEE MAR), INSULIN
HELD PER SLIDING SCALE, MORPHINE ADMINISTERED FOR PAIN, NG TUBE TAKEN OFF
SUCTION AFTER PO MEDICATIONS ADMINISTERED THROUGH NG TUBE. IV ABX NOW INFUSING
AS ORDERED (SEE MAR). PT REMAINS RESTING IN BED AND REPORTS NO NEEDS WHEN
ASKED. KHAN EMPTIED OF 140ML OF URINE. WILL CONTINUE PLAN OF CARE. CALL LIGHT
IN REACH.

## 2022-04-25 NOTE — NUR
PATIENT RESTING QUIETLY IN BED. SISTER IN ROOM AT THIS TIME. VITALS CHARTED,
CALL LIGHT IN EASY REACH

## 2022-04-25 NOTE — NUR
ORAL CONTRAST GIVEN AS PATIENT TO HAVE CT OF ABD THIS MORNING. ASSESSMENT
DONE. HEPARIN GTT INFUSING  UNITS HR. TALKED WITH PATIENT ABOUT POC FOR
DAY, WILL REINFORCETHIS THROUGH DAY.

## 2022-04-25 NOTE — NUR
PATIENT APPEARS TO BE SLEEPING SOUNDLY. VS STABLE. KHAN HAS GOOD URINE
OUTPUT. SLIGHTLY OZZY IN COLOR. IV FLUIDS AND HEPARIN INFUSING PER ORDER.
SITE WNL X2. FAMILY AT BEDSIDE.

## 2022-04-25 NOTE — NUR
NEW BAG IV FLUIDS STARTED. PATIENT APPEARS TO BE RESTING IWHT EYES CLOSED.
FAMILY IN ROOM ALSO RESTING. VS STABLE. KHAN DRAINING FREELY. IV SITES WNL
X3.

## 2022-04-25 NOTE — NUR
PT LAYING IN BED SLEEPING AT THIS TIME, IVF, IV HEPARIN, AND IV ABX INFUSING
AS ORDERED. PT IN NO APPARENT DISTRESS, NG TUBE STILL CLAMPED AT THIS TIME.
SCHEDULED IV ABX STARTED AND NOW INFUSING AT ORDERED RATE. PT LEFT
UNDISTURBED, NO APPARENT NEEDS NOTED AT THIS TIME, WILL CONTINUE PLAN OF CARE.
CALL LIGHT IN REACH.

## 2022-04-25 NOTE — NUR
ASSISTED LAB IN DRAWING MRONING LABS. PATIENT REPORTS SOME PAIN IN ABD BUT NOT
UNBEARABLE. PATIENT UP TO THE CHAIR. ASSISTED WITH MORNING CARES. ABD IS TIGHT
AND TENDER. KHAN EMPTIED. IV FLUIDS PER ORDER, SITES WNL.

## 2022-04-25 NOTE — NUR
PT LAYING IN BED SLEEPING AT THIS TIME IN NO APPARENT DISTRESS. NG TUBE
CONNECTED TO LOW INTERMITTENT SUCTION AT THIS TIME. URINE OUTPUT FOR THE LAST
TWO HOURS WAS 60ML, URINE CONCENTRATED AT THIS TIME. IVF, HEPARIN, AND IV ABX
INFUSING AT ORDERED RATES. NO NEEDS NOTED AT THIS TIME, PT LEFT UNDISTURBED
AND REMAINS SLEEPING, CALL LIGHT IN REACH, WILL CONTINUE PLAN OF CARE.

## 2022-04-26 NOTE — NUR
PT SLEEPING IN BED AT THIS TIME IN NO APPARENT DISTRESS. D5LR INCREASED TO
125ML/HR, 250ML BOLUS OF LR STARTED (SEE MAR). PT REMIANS ASLEEP AND WAS LEFT
UNDISTURBED, WILL CONTINUE PLAN OF CARE. CALL LIGHT IN REACH.

## 2022-04-26 NOTE — NUR
PT RESTING IN BED AT THIS TIME AWAKE, IVF AND HEPARIN INFUSING AT ORDERED
RATES, NG TUBE ON AT LIS. BILE PRESENT IN TUBING. PT REPORTS NO NEEDS AT THIS
TIME WHEN ASKED. SCHEDULED LABS DRAWN FROM MIDLINE AT THIS TIME AFTER
MOMENTARILY STOPPING FLUIDS AND WASTING. LABS SENT, PT REPORTS NO NEEDS, CALL
LIGHT IN REACH, BED IN LOWEST POSITION, WILL CONTINUE PLAN OF CARE.

## 2022-04-26 NOTE — NUR
Spoke with pt and her daughter Ariadna.  They have been discussing needs for
when pt discharges. Pt lives with daughter and granddaughter. Granddaughter
has a state paid cg, her boyfriend.  Per Ariadna he feels he could also care for
Lis. Ariadna is considering taking time off, we discussed FMLA. Pt is able
to walk in the room. We also discussed HH and OP therapy at the [facility]. Let them know I will assist anyway I can when she gets closer to dc.

## 2022-04-26 NOTE — NUR
PATIENT RESTING IN BED, VITALS AND I&OS CHARTED. KHAN EMPTIED. CALL LIGHT IN
EASY REACH, NO OTHER NEEDS AT THIS TIME

## 2022-04-26 NOTE — NUR
PT RESTING IN BED SLEEPING AT THIS TIME NG TUBE STILL CLAMPED. IVF AND IV
HEPARIN INFUSING AT ORDERED RATE. IV ALBUMIN COMPLETED, FLAGYL STARTED AND
INFUSING AT ORDERED RATE (SEE MAR). PT REMAINS ASLEEP AT THIS TIME AND WAS
LEFT UNDISTURBED, CALL LIGHT IN REACH, WILL CONTINUE PLAN OF CARE.

## 2022-04-26 NOTE — NUR
FLAGYL COMPLETED. MIDLINE ASSESSED, BRISK BLOOD RETURN. SL. pt RESTING IN BED
WITH EYES CLOSED, RESPIRATIONS REGULAR. CALL LIGHT WITHIN REACH.

## 2022-04-26 NOTE — NUR
PT RESTING IN BED AWAKE AND ALERT IVF AND IV HEPARIN INFUSING AT ORDERED RATE.
PT REQUESTS A PRN LOZENGE AT THIS TIME. VITALS TAKEN AT THIS TIME. NG TUBE ON
LOW INTERMITTENT SUCTION, GREEN BILE PRESENT IN TUBING. PT TAKEN OFF OF
SUCTION PRIOR TO MEDICATION ADMINISTRATION. 40ML WATER FLUSH USED AT THIS
TIME.
SCHEDULED MEDICATION ADMINISTERED ALONG WITH PRN CEPACOL LOZENGE (SEE MAR).
INSULIN HELD PER SLIDING SCALE (SEE MAR). PT DENIES THE NEED FOR PAIN
MEDICATION AT THIS TIME. ASSESSMENT COMPLETED AT THIS TIME. PT ALERT AND
ORIENTED, MURMUR PRESENT, RYTHM REGULAR, LUNGS CLEAR IN UPPER LOBES,
DIMINISHED IN THE BASES, BOWEL TONES RARE AT THIS TIME. ABDOMEN DISTENDED AND
GUARDED. AFTER ASSESSMENT PT REPORTS NO FURTHER NEEDS AND IS NOW RESTING IN
BED, WARM BLANKETS PROVIDED TO PT. NEW ORDERS GIVEN BY DR. SUE TO DECREASE
THE HEPARIN DRIP  UNITS/HR, DRIP TITRATED DOWN TO ORDERED RATE. PT
REPORTS NO FURTHER NEEDS, WILL CONTINUE PLAN OF CARE. CALL LIGHT IN REACH, BED
IN LOWEST POSITION.

## 2022-04-26 NOTE — NUR
PT LAYING IN BED SLEEPING AT THIS TIME, IVF AND HEPARIN INFUSING AS ORDERED.
NEW BAG OF IVF STARTED ALONG WITH SCHEDULED IV ABX (SEE MAR). 28ML OF URINE
OBTAINED FOR THE HOUR. NG TUBE STILL ON AT LOW INTERMITTENT SUCTION, BILE
DRAINING, 150ML PRESENT IN THE CONTAINER FOR THE SHIFT. PT IN NO APPARENT
DISTRESS AT THIS TIME AND WAS LEFT UNDISTURBED AND REMAINS ASLEEP. NO FURTHER
NEEDS ASSESSED AT THIS TIME, WILL CONTINUE PLAN OF CARE. CALL LIGHT IN REACH,
BED IN LOWEST POSITION.

## 2022-04-26 NOTE — NUR
PT LAYING IN BED SLEEPING AT THIS TIME, NG ON LOW INTERMITTENT SUCTION, IVF,
IV ABX, AND IV HEPARIN INFUSING AT PREVIOUS RATES. ASSESSMENT COMPLETED AT
THIS TIME, NO CHANGES NOTED AT THIS TIME. PT IN NO APPARENT DISTRESS AND
REMAINS SLEEPING IN THE BED. PT'S DAUGHTER AT THE BEDSIDE RECLINER SLEEPING AS
WELL. NO FURTHER NEEDS ASSESSED AT THIS TIME, WILL CONTINUE PLAN OF CARE.

## 2022-04-26 NOTE — NUR
PT LAYING IN BED SLEEPING AT THIS TIME, IVF AND HEPARIN INFUSING AS ORDERED.
PT AWOKE AT THIS TIME AND WAS ALERT AND ORIENTED. VITALS TAKEN AND ASSESSMENT
COMPLETED (SEE CHART). ABDOMEN UNCHANGED, STILL DISTENDED, BOWEL TONES
HYPOACTIVE AT THIS TIME. PT REPORTS 7-8/10 PAIN IN HER ABDOMEN AND NARE FROM
THE NG TUBE, PRN MORPHINE ADMINISTERED (SEE MAR). ICE CHIPS THEN PROVIDED TO
PATIENT AND CONSENT FOR CENTRAL LINE SIGNED AT THIS TIME. PT REPORTS NO
FURTHER NEEDS AT THIS TIME AND IS RESTING IN BED, WILL CONTINUE PLAN OF CARE.
CALL LIGHT IN REACH, BED IN LOWEST POSTIION.

## 2022-04-26 NOTE — NUR
DR. SUE HERE TO SEE PATIENT. ORDERS RECIEVED. PATIENT IS SITTNG UP IN CHAIR.
DAUGHTER IS IN ROOM. NG CLAMPED AS MEDS GIVEN PER NGT.

## 2022-04-26 NOTE — NUR
BEDBATH COMPLETE, NEW GOWN AND LINENS PROVIDED. NG IN PLACE. PATIENT UP TO
CHAIR. DAUGHTER ASSISTING WITH CARES. CALL LIGHT IN EASY REACH

## 2022-04-26 NOTE — NUR
PT LAYING IN BED SLEEPING ON THE BIPAP AT THIS TIME AT PREVIOUS SETTINGS, IVF
INFUSING AS ORDERED. SCHEDULED MAGNESIUM BAG 2 OF 2 STARTED AND NOW INFUSING
AT ORDERED RATE.  CBG ASSESSED AND  MG/DL AT THIS TIME. PT AWOKE
MOMENTARILY PRIOR TO CBG CHECK AND IS STILL DROWSY AND RETURNED TO SLEEP. NO
FURTHER NEEDS ASSESSED AT THIS TIME, WILL CONTINUE PLAN OF CARE.

## 2022-04-26 NOTE — NUR
PATIENT RESTING IN BED, EYES CLOSED. DAUGHTER IN ROOM- CAREGIVER TRAY ORDERED.
KHAN EMPTIED, VITALS AND I&OS CHARTED. NO OTHER NEEDS AT THIS TIME

## 2022-04-26 NOTE — NUR
DR. SUE UPDATED ON PT AT THIS TIME, DECREASING URINE OUTPUT OF 20ML/HR IN
LAST TWO HOURS REPORTED. NEW ORDERS GIVEN TO INCREASE D5LR , ADMINISTER
A 250 ML BOLUS OF LR OVER 1 HOUR, AND TO NOTIFY IN URINE OUTPUT IS UNDER
25ML/HR FOR THE NEXT TWO HOURS. WILL CONITNUE PLAN OF CARE.

## 2022-04-27 NOTE — NUR
PT LAYING IN BED AT THIS TIME SLEEPING, IVF AND HEPARIN INFUSING AT ORDERED
RATES. PT AWOKE EASILY AND WAS ORIENTED X3, VITALS TAKEN (SEE CHART). PT HAD
COMPLAINTS OF NARE PAIN, SLIGHT ABDOMINAL PAIN, AND LEFT NARE PAIN. PRN
CEPACOL PROVIDED (SEE MAR). PT NG FLUSHED AFTERWARDS AND REMAINS ON LIS.
ASSESSMENT THEN COMPLETED. MURMUR PRESENT, FINE CRACKLES HEARD IN UPPER LOBES
BILATERALLY AND DIMINISHED WITH FINE CRACKLES IN THE BASES. PT ENCOURAGED TO
USE THE I.S AND DEEP BREATH/COUGH. ABDOMEN DISTENDED, HYPOACTIVE TONES
PRESENT, ABDOMEN GUARDED, PAINFUL WHEN PALPATED. AFTER ASSESSMENT PT REQUESTED
PRN PAIN MEDICATION, 2MG PRN MORPHINE ADMINISTERED FOR NARE PAIN AND ABDOMINAL
PAIN. PT REPOSITIONED ON THE BED AFTERWARDS, PILLOWS PLACED UNDER RIGHT SIDE
FOR COMFORT. NO FURTHER NEEDS REPORTED.
WILL CONTINUE PLAN OF CARE. CALL LIGHT IN
REACH, BED IN LOWEST POSITION.

## 2022-04-27 NOTE — NUR
PATIENT REPOSITIONED. DENIES PAIN. VS STABLE. KHAN DRAINING FREELY, OUTPUT
QS. PICC LINE WNL. NO CONCERNS AT THIS TIME. CALL LIGHT IN REACH.

## 2022-04-27 NOTE — NUR
PICC INSERTION NOTE
ORDERS RECIEVED TO EVALUATE KIANA FOR POSSIBLE PICC INSERTION DUE TO HER
NEED FOR TPN. HER LEFT ARM IS EXCLUDED AS A SITE DUE TO RESTRICTED EXTREMITY
STATUS. SHE IS ON A HEPARIN DRIP WHICH MAKES A EJ OR IJ APPROACH A HIGH RISK
PROCEDURE. SHE CURENTLY HAS A 4 FR 10CM MIDLINE IN PLACE IN HER RIGHT BASILIC
VEIN WHICH IS THE ONLY SUITABLE VEIN IN HER RIGHT ARM. AFTER REVIEWING THE
CHART AND DISCUSSING THE PLAN WITH THE MD, CCU RN, AND OTHER PICC NURSES, WE
ARE ELECTING TO EXCHANGE HER MIDLINE WITH A CENTRAL LINE. THE RISK AND
COMPLICATIONS OF THE PROCEDURE ARE DISCUSSED WITH THE PATIENT AND THE RISK FOR
INFECTION AND BLEEDING ARE HIGHLIGHTED FOR THE PATIENT. INFORMED CONSENT WAS
SIGNED PRIOR TO THE START OF THE PROCEDURE.
THE RIGHT ARM WAS COPIOUSLY CLEANED AND PREPPED AND THE MIDLINE WAS WITHDRAWN
5CM. THE PATIENT WAS DRAPED AND THE MIDLINE WAS STABILIZED AND CUT WITH 5CM
EXPOSED AND 5CM REMAINING IN THE PATIENT. BRISK DARK RED NONPULSATILE BLEEDING
WAS SEEN FLOWING FROM THE LINE. A GUIDEWIRE WAS PASSED UP THE MIDLINE AND THE
MIDLINE REMOVED. LOCAL ANESTHETIC WAS USED AND THE INTRODUCER WAS PASSED OVER
THE GUIDEWIRE. AN ATTEMPT WAS MADE TO DILATE THE VESSEL BUT SCAR TISSUE WAS
MAKING THIS IMPOSIBLE. AN 11 BLADE WAS USED TO OPEN A 0.5CM AREA ALONG THE
WIRE AND THEN THE INTRODUCER PASSED EASILY UP THE ARM. THE GUIDWIRE WAS
REMOVED AND THERE WERE NO PROBLEMS ADVANCING THE PICC. MAGNETIC TRACKING AND
3CG WAS USED TO CONFIRM THE PICC WAS IN A CENTRAL LOCATION. A STERILE DRESSING
WAS APPLIED AND A NONSTERILE LIGHT PRESSURE DRESSING WAS APPLIED OVER THE AREA
TO MINIMIZE BLEEDING AND BRUSING. A CXR WAS TAKEN AND WE AWAIT THE RESULTS OF
THIS PRIOR TO USING THE LINE.

## 2022-04-27 NOTE — NUR
DR. SUE UPDATED ON PT'S APTT AND OUTPUT, ORDERS GIVEN TO MAINTAIN HEPARIN
DRIP  UNITS/HR, WILL CONTINUE PLAN OF CARE.

## 2022-04-27 NOTE — NUR
PT LAYING IN BED SLEEPING AT THIS TIME. IVF AND HEPARIN INFUSING AS ORDERED,
NG TUBE ON AT LIS. SCHEDULED IV FLAGYL STARTED AND NOW INFUSING, NO FURTHER
NEEDS ASSESSED AT THIS TIME. 250ML OF CLEAR/YELLOW BILE IN CANNISTER FOR THE
SHIFT. CALL LIGHT IN REACH, BED IN LOWEST POSITION, WILL CONTINUE PLAN OF
CARE.

## 2022-04-27 NOTE — NUR
PATIENT PROVIDED WITH EVENING MEDS AND PRN LOZENGES. PATIENT REPOSITIONED IN
BED. DENIES ANY PAIN OR NAUSEA. VS STABLE. PICC LINE INFUSING HEPARIN AND
TPN/LIPIDS. PERIPHERAL SITE ESTABLISHED FOR IV ABX INFUSING. KHAN EMPTIED.
PATIENT PROVIDED ICE CHIPS. DENIES ANY OTHER NEEDS. CALL LIGHT IN REACH.

## 2022-04-27 NOTE — NUR
resting after po medications given. albumin hung. ASSESSMENT DONE. NGT TO LIS.
NOW CLAMPED AS MEDS GIVEN. DENIES ABD PAIN AT THIS TIME. TALKED WITH PATIENT
ABOUT POC FOR DAY, INDICATES UNDERSTANDING. [employer] PATENT.

## 2022-04-27 NOTE — NUR
PATEINT RECLINED IN RECLINER, LEGS ELEVATED. VISITORS IN ROOM. VITALS AND I&OS
CHARTED. KHAN EMPTIED

## 2022-04-27 NOTE — NUR
PATIENT IN SHOWER WITH RN PRADEEP ASSISTING. LINENS CHANGED. PATIENT UP IN CHAIR
AT THIS TIME. CALL LIGHT IN EASY REAH

## 2022-04-27 NOTE — NUR
PT LAYING IN BED SLEEPING, IV PUMP ALARMING, IV SITE WNL, FLUSHES WELL, FLUIDS
RESTARTED. HEPARIN STILL INFUSING AS ORDERED. PT AWOKE AT THIS TIME AND STATED
HER THROAT WAS FEELING SOAR AND ASKED FOR A PRN CEPACOL LOZENGE. PT ORIENTED
X4 AT THIS TIME WHEN ASKED. PRN CEPACOL ADMINISTERED, PT REPORTS NO FURTHER
NEEDS AT THIS TIME, CALL LIGHT IN REACH, BED IN LOWEST POSTION, WILL CONTINUE
PLAN OF CARE.

## 2022-04-27 NOTE — NUR
PT LAYING IN BED SLEEPING AT THIS TIME, IVF INFUSING AS ORDERED. PT AWOKE
EASILY AND WAS ORIENTED. NG TUBE PUT BACK ON LIS AT THIS TIME AFTER A 40ML
WATER FLUSH. NEW BAG OF D5LR STARTED AT ORDERED RATE. PT REPORTS THROAT
SORENESS, DENIES THE NEED FOR PAIN MEDICATION BUT REQUESTS PRN CEPACOL
LOZENGE. PRN CEPACOL LOZENGE PROVIDED. VITALS AND ASSESSMENT COMPLETED, NO
CHANGES NOTED. ABDOMEN SEVERELY DISTENED, GUARDED, BOWEL TONES RARE, PAINFUL
WHEN PALPATED. PT REPORTS NO FURTHER NEEDS AT THIS TIME AND WAS REPOSITIONED
IN BED, PILLOWS PLACED UNDER LEFT HIP, CALL LIGHT IN REACH, BED IN LOWEST
POSITION, WILL CONTINUE PLAN OF CARE.

## 2022-04-28 NOTE — NUR
JASSI JACKSON SPOKE WITH MD CORBETT AND PER MD CORBETT DO NOT GIVE VIT. K THAT IS
SCHEDULED FOR MONDAY 5/2/2022. THIS RN WILL DC MEDICATION.

## 2022-04-28 NOTE — NUR
LIPID INFUSION FINISHED. LINE DISCONNECTED. PATIENT RESTING IN BED. REPORTS
HAVING MOVES HERSELF IN THE BED AND CURRENTLY FEELING COMFORTABLE. DOES
REPORT THAT SHE HAS HAD SOME PAIN WITH THE BP CUFF ON HER RIGHT LEG. CUFF
REMOVED TO ALLOW FOR A BREAK. PATIENT OTHERWISE FEELS WELL. NO PAIN OR NAUSEA.
BOWEL SOUNDS ACTIVE, ABD MILD DISTENSION. LUNG SOUNDS ARE CLEAR AND DIM,
TOLERATING ROOM AIR. OCCATIONALLY USING IS WHILE AWAKE. HEPARIN AND TPN
INFUSING PER ORDER INTO PICC LINE, SITE WNL. ENCOURAGED PATIENT TO REST AND
MADE PLAN TO GET HER TO THE CHAIR PRIOR TO DAYSHIFT ARRIVAL. CALL LIGHT IN
REACH.

## 2022-04-28 NOTE — NUR
PATIENT UP TO THE BATHROOM FOR BM. ESTIMATED MEDIUM BM, PARTIALLY LIQUID AND
GREEN. PATIENT AMBULATED INTO THE BATHROOM WITH 1PA. PATIENT MOVES SLOWLY BUT
APPEARS STEADY. PATIENT DENIED LIGHTHEADEDNESS. PATIENT RETURNED TO BED.
SCHEDULED MEDS PROVIDED. ACCU CHECK AND SSI PER ORDERS. PATIENT DENIES NAUSEA
OR PAIN. VS STABLE. FRESH ICE WATER AND ICE CHIPS PROVIDED. IV ABX IN
PERIPHERAL IV. HEPARIN AND TPN IN 2 LUMEN PICC. HEPARIN LINE FLUSHED WITH 10
MLS AND RETURNS BLOOD. LUNG SOUNDS ARE CLEAR WITH FINE CRACKLES IN THE BASES.
PATIENT USING IS REGULARLY. KHAN CARE DONE, OUTPUT QS. URINE COLOR IMPROVED.
CALL LIGHT IN REACH. LIGHTS DIMMED.

## 2022-04-28 NOTE — NUR
PATIENT IS UP SITTING IN THE CHAIR. HEPARIN GTT CONTINUES AT THIS TIME. WILL
REDRAW PTT THIS AFTERNOON. NO OTHER NEEDS AT THIS TIME. WILL CONTINUE TO
CLOSELY MONITOR.

## 2022-04-28 NOTE — NUR
THIS RN IN TO EMPTY PATIENTS KHAN CATHETER. PATIENT RESTING IN BED AT THIS
TIME. CALL LIGHT IS IN REACH. PATIENT DENIES ANY NEEDS. WILL CONTINUE TO
CLOSELY MONITOR.

## 2022-04-28 NOTE — NUR
DISCUSSED APTT RESULTS WITH DR. SUE. ORDERS TO INCREASE HEPARIN DRIP BY 50
UNITS AND RECHECK IN 6 HOURS. THIS DONE AND VERIFIED WITH STEPHANIE JACKSON.

## 2022-04-28 NOTE — NUR
PATIENT RESTING IN BED. PATIENT REPORT RECIEVED FROM NIGHT SHIFT RN. PATIENT
HAS CALL LIGHT AND CALLS APPROPRIATELY. WILL CONTINUE TO CLOSELY MONITOR.

## 2022-04-28 NOTE — NUR
PATIENT ASSESSMENT REMAINS UNCHANGED FROM PRIOR ASSESSMENTS. PATIENTS TPN
GIVEN. PATIENTS BLOOD SUGAR DONE AND INSULIN GIVEN PER ORDERS. PATIENT DENIES
ANY OTHER NEEDS AT THIS TIME. WILL CONTINUE TO CLOSELY MONITOR.

## 2022-04-28 NOTE — NUR
PATIENTS ASSESSMENT COMPLETED AND REMAINS UNCHAGED FROM PRIOR ASSESSMENT.
PATIENT REPOSITION IN THE CHAIR WITH TWO TN ASSIST. PATIENT TOLERATED WELL.
PATIENT DENIES ANY OTHER NEEDS AT THIS TIME. WILL CONTINUE TO CLOSELY MONITOR.

## 2022-04-28 NOTE — NUR
PATIENT SITTING UP IN RECLINER. VITALS AND I&OS CHARTED. KHAN EMPTIED AND
PATIENT BRUSHING TEETH AT THIS TIME. LINENS CHANGED AND GARBAGE EMPTIED. CALL
LIGHT IN EASY REACH, RN IN ROOM.

## 2022-04-28 NOTE — NUR
PTT BACK AND IS WITHIN THE NORMAL RANGE WITH NO TITRATION REQURIED. VERIFIED
WITH JASSI JACKSON. PATIENT UP FROM THE CHAIR AND USED CAMMODE AND NOW BACK TO BED.
PATIENT TOLERATED WELL. PATIENT NOW RESTING. MEDICATIONS GIVEN. WILL CONTINUE
TO CLOSELY MONITOR.

## 2022-04-28 NOTE — NUR
DR. CORBETT IN TO SEE PATIENT. PLAN TO ADVANCE TO SLIGHTLY MORE CLEAR LIQUIDS
AND SEE HOW SHE TOLERATES THIS. DISCUSSED WITH MD HOLDING Q WEEKLY VITAMIN K.

## 2022-04-28 NOTE — NUR
LAVBS DRAWN FROM PICC LINE. TPN AND HEPARIN PLACED IN STANDBY FOR >2MINS.
FLUSHED EACH LINE WITH 10ML NS. LINE CLAMPED AND SAMPLE DRAWN DIRECTLY FROM
LINE. 6ML WASTED, SAMPLE COLLECTED. NEW CLAVE PLACED AND FLUSHED WITH 10ML NS.
HEPARIN RESTARTED IN THIS LINE. TPN LINE CLAVE ALSO CHANGED AND FLUSHED,
RESTARTED TPN PER ORDER. MED INFUSIONS STOPPED LESS THAN 5MINS TOTAL. PATIENT
WOKE BREIGLY, DENIED ANY CONCERNS. IV ABX STARTED PER ORDER IN PERIPHERAL
SITE. KHAN EMPTIED. CALL LIGHT IN REACH.

## 2022-04-28 NOTE — NUR
PATIENTS ASSESSMENT COMPLETED. PATIENT IS RESTING IN BED AT THIS TIME.
PATIENTS BREATH SOUNDS CLEAR AND CRACKLES IN THE BASES. BOWEL TONES
HYPOACTIVE. BILATERAL LOWER EXTREMITY EDEMA NOTED. PATIENT ASSISTED UP TO THE
CAMMODE AND THEN NOW SITTING IN THE CHAIR. PATIENT DENIES ANY PAIN AT THIS
TIME. KHAN CATHETER IN PLACE WITH CLEAR YELLOW URINE PRESENT. PATIENT DENIES
ANY NEEDS AT THIS TIME. CALL LIGHT IN REACH. WILL CONTINUE TO CLOSELY MONITOR.

## 2022-04-29 NOTE — NUR
PATIENT WALKED THE HALLWAY IN CCU WITH THE CNA AND STUDENT NURSE. PATIENT
TOLERATED WELL. PATIENT NOW SITTING BACK IN THE CHAIR. PATIENT DENIES ANY
OTHER NEEDS AT SI TIME. WILL CONTINUE TO CLOSELY MONITOR.

## 2022-04-29 NOTE — NUR
Faxed over [id number] [phone]   THIS RN IN TO CHECK ON PATIENT. PATIENT VISITING WITH HER FAMILY. ALL
QUESTIONS ANSWERED. NO OTHER NEEDS AT THSI TIME. JULIA IN TO DRAW AFTERNOON
LABS. WILL CONTINUE TO CLOSELY MONITOR.

## 2022-04-29 NOTE — NUR
PATIENT SITTING UP IN RECLINER, ENJOYS LOOKING OUT THE WINDOW. KHAN EMPTIED,
AND AMBULATED [place].

## 2022-04-29 NOTE — NUR
PT PASSING GAS, BUT NOT GAS HAD SMALL BM GREEN IN COLOR IN CHAIR. PT AMBULATED
TO BATHROOM HAD LARGE GREEN IN COLOR BM LIQUID. ALSO REMOVED KHAN CATHETER AT
THIS TIME. EZRA-CARE GIVEN AT THIS TIME. FRESH PATIENT GROWN PLACED. PT
AMBULATED BACK TO THE THE CHAIR, FAMILY AT BEDSIDE AND PT GIVEN CLEAR LIQUIDS
TRAY.
PT ALSO PLACED IN AN ATTENDS.

## 2022-04-29 NOTE — NUR
DESIREE DAUGHTER AT THE BEDSIDE. UPDATED ON PATIENTS PLAN OF CARE. ANSWERED
ALL QUESTIONS. PATIENTS ASSESSMENT COMPLETED AND REMAINS UNCHANGED. WILL
CONTINUE TO CLOSELY MONITOR.

## 2022-04-29 NOTE — NUR
SBA WITH PATIENT TO BR FOR SMALL BM. PATIENT TOLERATED WELL. PATIENT NOW UP TO
CHAIR FOR BREAKFAST, LEGS ELEVATED, KHAN EMPTIED. VITALS AND I&OS CHARTED.
LINENS CHANGED. CALL LIGHT IN EASY REACH

## 2022-04-29 NOTE — NUR
LABS DRAWN FROM PICC LINE. MEDS STOPPED FOR >3 MINS, FLUSHED 10 MLS IN PORT
THAT HAD HEPARIN INFUSING, REMOVED CLAVE, JOHN 6 MLS WASTE AND THEN LAB
SAMPLE. NEW CLAVE IN PLACE AND FLUSHED 10 MLS NS. PORT WITH TPN CLAVE CHANGED
AND FLUSHED 10 MLS. IV ABX STARTED IN PERIPHERAL SITE. KHAN EMPTIED. ASSISTED
PATIENT TO REPOSITION. DISCUSSED PLAN OF THE DAY TO INCREASE ORAL INTAKE AND
AMBULATE TODAY. PATIENT FEELING UP FOR THIS. CALL LIGHT IN REACH.

## 2022-04-29 NOTE — NUR
PATIENT SHIFT REPORT RECIEVED FROM NIGHT SHIFT RN. PATIENT IS RESTING I NBED.
NIGHT SHIFT RN TITRATED HEPARIN GTT PER ORDERS AND PER MD RASCH. NO OTHER
NEEDS AT THIS TIME. WILL CONTINUE TO CLOSELY MONITOR.

## 2022-04-29 NOTE — NUR
PT ASSISTED BACK TO BED, WALKER GIVEN FOR AMBULATION. STEADY WITH WALKER.
ASSESMENT DONE. LUNGS CLEAR, DENIES ABDOMINAL PAIN. TPN AND LIPIDS INFUSING
THROUGH PICC. PT ALERT AND ORIENTED. ASSISTED WITH BRUSHING TEETH AND HS CARE.
PT STATES SHE VOIDED A GOOD AMOUNT BUT THERE WAS NOT A HAT IN THE TOILET, ALSO
HAD LOOSE STOOL. CALL LIGHT IN REACH.

## 2022-04-29 NOTE — NUR
PATIENT SHIFT ASSESSMENT COMPLETED. PATIENT IS RESTING IN BED AT THIS TIME.
CNA AND STUDENT NURSE BREONAN IN TO ASSIST PATIENT UP TO THE CAMMODE AND INTO
THE CHAIR. PATIENT UPDATED ON PLAN OF CARE. MD QUIROS WAS IN TO SEE PATIENT.
WILL POSSIBLY TAKE THE KHAN CATHETER OUT TODAY AND TALK TO MD CORBETT ABOUT
TRANSITIONING PATIENT TO ORAL ANTICOAGULATION TODAY. PATIENT WORKING ON A
CLEAR LIQUID TRAY AND HAS BEEN TOELRATING WELL. WILL CONTINUE TO CLOSELY
MONITOR.

## 2022-04-29 NOTE — NUR
PT CALLED TO USE BATHROOM. UP TO VOID 450 ML THEN BACK TO BED. ASSESSMENT
DONE, NO CHANGES FROM PREVIOUS ASSESSMENT.

## 2022-04-29 NOTE — NUR
PT HAS BEEN UP IN THE CHAIR MOST OF THE SHIFT, HAS AMBULATED IN THE MITCHELL
TODAY. PT FAMILY HAS BEEN INTO VISIT. HEPRIN DRIP INFUSING 900UNITS=18ML/HS,
TPN 84ML/HR AND LIPIDS 20.8 ML/HR INTO HER PICC LINE IN HER UPPER RIGHT ARM.
ALSO HAD BLOOD RETURN WHEN FLUSHING BETWEEN TPN BAGS. PT HAS TOLERATED PO
FLUIDS SO FAR THIS SHIFT. SHE IS HAVING GREEN IN COLOR BM LIQUID. FOLY
CATHETER DC'D AT 18:00 AND HAS NOT VOID AS OF THIS TIME.

## 2022-04-29 NOTE — NUR
2PA WITH PATIENT FOR WALK IN CCU [place]. PATIENT TOLERATED WELL AND WAS QUITE
TIRED UPON RETURNING TO HER ROOM. PATIENT NOW UP IN CHAIR FACING WINDOW. ALEXX
LIGHT IN EASY REACH

## 2022-04-29 NOTE — NUR
PT REMAINS UP IN CHAIR AT THIS TIME, TPN INFUSION AT THIS TIME. K/PHOS RIDER
COMPLETED AT THIS TIME.

## 2022-04-29 NOTE — NUR
TPN DAY 3 TODAY. CURRENT TPN RX IS PROVIDING AN AVERAGE 1634 CALORIES 
GM PROTEIN PER 24 HOURS. THIS IS PROVIDING 23 ALEXX/KG AND 1.4 GM PROTEIN/KG.
SHE IS ALSO ON A CL DIET, BUT SHE DID NOT CONSUME ANY CL'S FOR DINNER LAST
NIGHT. INITIALLY I SUGGESTED LIPIDS  ML BUT PATIENT IS RECEIVING 250 ML
(M-W-F). WILL CONTINUE TO MONITOR PATIENT'S INTAKE OF CLEAR LIQUIDS TO MEET
HER ESTIMATED CALORIE NEEDS OF [phone] PER DAY. PATIENT IS NOW ON COUMADIN
FOR ANTICOAGULATION. WILL PROVIDE DIET EDUCATION UPON DISCHARGE ON DIET WHILE
ON COUMADIN IF NEEDED.

## 2022-04-30 NOTE — NUR
PLACED NEW IV SITE, 22G RIGHT HAND, UNABLE TO USE PT'S LEFT ARM DUE TO CANCER
HX.
REMOVED IV TO RGHT FOREARM THAT HAS BEEN PAINFUL TO PT WHEN FLUSHED, TIP
INTACT GUAZE AND TAPE APPLIED

## 2022-04-30 NOTE — NUR
PATIENT RESTING IN HER CHAIR AT THIS TIME. PATIENT RESTING IN THE CHAIR AT
THIS TIME. PATIENT WAS UP WORKING WITH PHYSICAL THERAPY AND TOELRATED WELL.
PATIENT DENIES ANY OTHER NEEDS AT THIS TIME. WILL CONTINUE TO CLOSELY MONITOR.

## 2022-04-30 NOTE — NUR
PATIENT SHIFT ASSESSMENT COMPLETED. PATIENT RESTING IN THE CHAIR NOW AT THIS
TIME. PATIENT WAS UP TO THE BATHROOM WITH PHYLLIS JACKSON. PATIENT TOLERATED WELL.
PATIENTS DAUGHTER IS AT THE BEDSIDE AT UPDATED ON PLAN OF CARE. PHARMACY WILL
BE OVER TO DO SOME EDUCATION ON WARFARIN. PATIENT WORKING ON A CLEAR LIQUID
TRAY. WILL CONTINUE TO CLOSELY MONITOR. NO OTHER NEEDS AT THIS TIME.

## 2022-04-30 NOTE — NUR
REPORT RECEIVED FORM DEVIKA JACKSON, PT HAS JUST GOTTEN BACK IN BED FROM BATHROOM
AND IS RESTING IN BED WITH EYES CLOSED.

## 2022-04-30 NOTE — NUR
PT CALLS TO USE BATHROOM, UP TO VOID WITH SBA AND FWW, STEADY ON FEET, BACK TO
BED. PT IS SLIGHTLY SOB WITH ACTIVITY, RECOVERS QUICKLY. ASSESSMENT DONE AND
PT WANTING TO TRY TO SLEEP SOME MORE, STATES SHE WAS ABLE TO FALL ASLEEP FOR A
WHILE.

## 2022-04-30 NOTE — NUR
THIS RN IN TO CHECK ON PATIENT. PATIENT RESTING IN THE CHAIR AT THIS TIME.
PATIENT WAS UP WITH PHYLLIS JACKSON TO THE BATHROOM. PATIENT TOLERATED WELL.
PATIENT WORKING ON HER FULL LIQUID TRAY. NO CHANGES IN ABD. PATIENT TOELRATING
FOOD WELL. WILL CONTINUE TO CLOSELY MONITOR.

## 2022-04-30 NOTE — NUR
PATIENT ASSISTED UP TO THE BATHROOM PATIENT WALKS STEADILY WITH A WALKER.
HEPARIN GTT REMAINS UNCHANGED. TPN INFUSING. WILL CONTINUE TO CLOSELY MONITOR.

## 2022-04-30 NOTE — NUR
THIS RN IN TO GIVE EVENING MEDICATION. PATIENT TOLERATED WELL. WARM BLANKET
PROVIDED. PATIENTS BELONGINGS ARE WITHIN REACH. PATIENT HAS FAMILY VISITING.
NO OTHER CHANGES. PATIENTS ASSESSMENT REMAINS UNCHANGED FROM PRIOR
ASSESSMENTS. CALL LIGHT WITHIN REACH. WILL CONTINUE TO CLSOELY MONITOR.

## 2022-04-30 NOTE — NUR
PT UP TO BR TO VOID AND HAVE LOOSE STOOL THEN BACK TO BED. ASSESSMENT DONE.
DENIES PAIN AT THIS TIME.

## 2022-04-30 NOTE — NUR
PATIENT SHIFT REPORT RECIEVED FROM NIGHT SHIFT RN. PATIENT RESTING IN BED AT
THIS TIME. PATIENT REMAINS ON HEPARIN GTT. PER REPORT NO CHANGES. WILL
CONTINUE TO CLOSELY MONITOR.

## 2022-04-30 NOTE — NUR
IN TO DO ASSESSMENT AND HS MEDS AND HS CARE. PT DENIES PAIN AT THIS TIME,
ALERT AND ORIENTED, REPORTS BEING TIRED AS SHE DID NOT SLEEP MUCH LAST NIGHT
AND WANTS TO GO TO SLEEP SOON. LUNGS CLEAR, DENIES ABDOMINAL PAIN. HEPARIN GTT
INFUSING AT 900UNITS/HR, TPN INFUSING AND IV ANTIBIOTICS STARTED. PT HAS CALL
LIGHT IN REACH AND WILL CALL WITH NEEDS.

## 2022-04-30 NOTE — NUR
PATIENT UP TO THE BATHROOM WITH STAND-BY ASSIST. PATIENT BACK TO THE CHAIR.
WARM BLANKET PROVIDED. PATIENT DENIES ANY NEEDS AT THIS TIME. CALL LIGHT IN
REACH. WILL CONTINUE TO CLOSELY MONITOR.

## 2022-04-30 NOTE — NUR
PT ONE PERSON STANDBY ASSIST WITH WALKER TO THE BATHROOM. PT VOIDED AND HAD
LIQUID BM. PT COMPLETED OWN EZRA CARE, AND NEW ATTENDS PROVIDED. PT NOW IN
CHAIR, FACING WINDOW. FLUIDS CONTINUE TO INFUSE. PT'S DAUGHTER IN ROOM. CALL
LIGHT WITHIN REACH. NO OTHER REQUESTS AT THIS TIME.

## 2022-04-30 NOTE — NUR
PATIENT ADVCANCED TO A FULL LIQUID DIET. TRAY PROVIDED. PATIENTS ASSESSMENT
REMAINS UNCHANGED. PATIENT CONTINUES TO DENY ANY ABD PAIN. MEDICATIONS GIVEN.
PATIENT DENIES ANY OTHER NEEDS AT THIS TIME. CALL LIGHT IN REACH. WILL
CONTINUE TO CLOSELY MONITOR.

## 2022-05-01 NOTE — NUR
IN PATIENT'S ROOM FOR ASSESSMENT, MED ADMIN, AND TO HELP PATIENT UP TO
BATHROOM AND THEN INTO CHAIR. PT ABLE TO GET OUT OF BED WITHOUT ANY ASSITANCE,
AND RATES HER PAIN A 3/10. PT VOIDS 500 ML AND HAS A GREEN LIQUID LIKE BM IN
TOILET. PT INTO CHAIR NOW AND AM MEDS GIVEN. WAITING ON BREAKFAST STILL. 2-3+
EDEMA IN LOWER LEGS AND THE SKIN OVER LEGS FEEL VERY TAUT. CRACKLES ALSO NOTED
BILATERAL MID TO LOWER LUNG BASES, R >L. SP02 IS 95% ON ROOM AIR. PT STATES
SHE IS SLIGHLTLY SHORT OF BREATH WITH WALKING BUT DOES FEEL LIKE SHE CAN TAKE
A FULL BREATH. TPN CONTINUES AT 84 ML/HR. AM LABS DRAWN FROM PICC AFTER
HEPARIN WAS PLACED ON HOLD FOR 5 MINUTES. PENDING LAB VALUES THIS AM. WILL
CONTINUE TO MONITOR CLOSELY.

## 2022-05-01 NOTE — NUR
IN TO CHECK ON PT, SHE STATES SHE IS GETTING SLEEPY, REPORTS ALL HER ACTIVITY
THAT SHE DID WITH PHYSICAL THERAPY TODAY, IS FEELING POSITIVE ABOUT IT BUT
TIRED. ASSISTED WITH PUTTING IN SOME EYE DROPS. FRESH EYE DROPS GIVEN PER
REQUEST.

## 2022-05-01 NOTE — NUR
PATIENT UP TO BATHROOM AGAIN TO VOID AND HAD ANOTHER LIQUID GREEN BM. PT
ASKING TO GO FOR A WALK AND HELPED TO AMBULATE IN CCU  HALLWAY. PT TOLERATED
WELL. PT'S ASSESSMENT SIMILAR TO THIS AM, BUT LESS CRACKLES IN LUNGS AFTER
LASIX WAS GIVEN. PT READY FOR HER LUNCH AND THEN WANTING TO TAKE A SHOWER
AFTER LUNCH. PT REMAINS IN GOOD SPIRITS, AND UP IN CHAIR SITTING CLOSE TO
WINDOW. CONTINUE TO MONITOR. AM LABS ORDERED PER TPN ORDER SHEETS.

## 2022-05-01 NOTE — NUR
IN TO CHECK ON PT, SHE IS LYING AWAKE IN BED, STATES SHE HAS NOT BEEN ABLE TO
FALL ASLEEP, ASSISTED WITH REPOSITIONING BACK IN BED AND SHE THINKS THIS WILL
HELP, CALL LIGHT IN REACH.

## 2022-05-01 NOTE — NUR
ASSISTED PT TO RESTROOM. PT AMBULATED WELL WITH WALKER ASSISTANCE. CHANGED PT
DEPENDS. RECORDED 1 INCONTINENCE IN DEPENDS AS WELL  ML VOIDED INTO HAT.
PT IS NOW RESTING IN CHAIR. REQUESTED WIPES TO CLEAN FACE, NOW AT BEDSIDE.
CALL LIGHT IS WITHIN REACH, NO FURTHER NEEDS AT THIS TIME.

## 2022-05-01 NOTE — NUR
DR. CORBETT IN TO SEE PATIENT AND PLAN OF CARE DISCUSSED. PT WILL RECEIVE
ANOTHER 5 MG DOSE OF COUMADIN TODAY AND REMAIN ON HEPARIN GTT  UNITS/HR.
NEXT APTT IS TOMORROW AM AND INR LEVEL TOMORROW. PT UP TO SHOWER AND HELPED
WITH THIS. PT TOLERATED THIS WELL. PT REMAINS OFF CARDIAC MONITOR AFTER
REVIEWING WITH BOTH DR. QUIROS AND DR. CORBETT THAT THIS IS OKAY. PT HAS BEEN IN
A NORMAL SINUS RHYTHM SINCE ADMISSION. PT NOW BACK IN CHAIR AND WATCHING TV,
WAITING FOR HER FULL LIQUID DINNER. CONTINUE TO MONITOR. CALL LIGHT IN CHAIR
WITH PATIENT.

## 2022-05-01 NOTE — NUR
PT CALLS FOR ASSISTANCE GETTING TO BATHROOM, IN TO BR TO VOID AND HAS LOOSE
STOOL, BACK TO BED, SBA WITH FWW. PT STATES HER COCCYX IS HURTING, FLOATED AND
PILLOWS AND SHE STATES THAT IS MUCH BETTER, CALL LIGHT IN REACH, WILL TRY TO
GET BACK TO SLEEP. CONT TO DENY ABDOMINAL PAIN.

## 2022-05-01 NOTE — NUR
DR. QUIROS IN ROOM THIS AM AND PLAN OF CARE DISCUSSED WITH PATIENT AND THIS RN.
PT WILL RECEIVE A 1 TIME DOSE OF IV LASIX FOR HER INCREASED SWELLING IN LEGS
AND CRACKLES IN LUNGS. APTT IS 89 AND WILL EVAL HEPARIN PROTOCOL. INR IS 1.34
THIS AM. CONTINUE TO MONITOR.

## 2022-05-01 NOTE — NUR
WILL HOLD OFF ON LABS AS PT LOOKS TO BE RESTFUL AND HAS BEEN HAVING TROUBLE
SLEEPING. HEPARIN DRIP CONTINUES TO INFUSE AT 900UNITS/HR AND TPN INFUSING AS
WELL.

## 2022-05-01 NOTE — NUR
IN TO DO ASSESSMENT AND HS MEDS. PT DENIES ABD PAIN OR FEELING SOB. LUNGS HAVE
CRACKLES IN BASES. PT DOES HAVE MORE EDEMA TONIGHT THAN SHE DID LAST NIGHT IN
LEGS BILAT. SHE IS MOVING BETTER TONIGHT AND ABLE TO DO MORE ON HER OWN.
REMAINS ALERT AND ORIENTED, DENIES NEEDS OTHER THAN WANTING TO SLEEP.

## 2022-05-02 NOTE — NUR
PATIENT SITTING UP IN CHAIR, SISTER IN ROOM. VITALS AND I&O'S CHARTED. CALL
LIGHT IN REACH. NO FURTHER NEEDS AT THIS TIME.

## 2022-05-02 NOTE — NUR
DR. CORBETT IN TO SEE PATIENT AND PLAN OF CARE DISCUSSED. PT WILL REMAIN ON LOW
FIBER DIET AND MONITOR INR LEVELS CLOSELY, ALONG WITH APTT. APTT WILL BE DRAWN
AT 1400 AND HEPARIN CHANGED ACCORDINGLY.

## 2022-05-02 NOTE — NUR
DR. QUIROS IN ROOM TO SEE PATIENT. PLAN IS FOR PATIENT TO TRANSFER TO THE
MEDICAL FLOOR TODAY. PT TO GET MORE LASIX TODAY AS WELL. PT'S DAUGHTER IN ROOM
AND PLAN OF CARE DISCUSSED WITH HER AS WELL. ALL QUESTIONS ANSWERED AS BEST AS
POSSIBLE.

## 2022-05-02 NOTE — NUR
CALLS FOR HELP UP TO BR, UP TO BR WITH SBA/FWW. SLIGHTLY SOB WITH WALKING.
STARTED TO GO OUT FOR A WALK BUT THEN LAB CAME INTO ROOM SO SHE GOT BACK INTO
BED.

## 2022-05-02 NOTE — NUR
Spoke with pt and her daughter.  Pt is up in a chair.  Feeling well.  Daughter
is working on getting a walk in shower in the home.  Pt would like a walker, I
will notify the Dr.  Discussed 1/2 bedrails to assist pt to sit up.  Pt does
not feel she needs. Daughter is encouraging pt, but pt does not want.  They
deny other needs.  Pt is telling daughter education was provided for warfarin
and what foods she will need to change. They deny other needs.  Will get walke
ordered today.

## 2022-05-02 NOTE — NUR
PATIENT UP TO BATHROOM AGAIN TO VOID AND THEN HELPED TO WALK TO MED/SURG ROOM
111. PATIENT TOLERATED THIS WALK WELL. ALL PERSONAL BELONGINGS TAKEN WITH
PATIENT. APTT TO BE DRAWN AT 1400 AND HEPARIN TITRATED IF NEEDED. REPORT GIVEN
TO LARISA ON MED/SURG.

## 2022-05-02 NOTE — NUR
APTT 133 THIS AM. HEPARIN GTT PLACED ON HOLD AT 0738 AND WILL BE ON HOLD FOR
30 MINUTES, THEN RATE WILL DECREASE  UNITS/HR. PT NOW UP TO BATHROOM AND
WILL GET TO CHAIR NEXT FOR BREAKFAST. PT REPORTS SHE SLEPT FAIR IN THE NIGHT.
PT MOVING WELL WIHT FWW AND A SBA. TPN CONTINUES AT 84 ML/HR. ASSESSMENT TO BE
COMPLETED. NEW ATTENDS PROVIDED.

## 2022-05-02 NOTE — NUR
PT CALLS TO USE BATHROOM, UP TO VOID AND LOOSE STOOL, BACK TO BED, SBA/FWW. NO
C/O AT THIS TIME, STATES SHE HAS BEEN ABLE TO SLEEP WELL THE LAST COUPLE OF
HOURS. ASSESSMENT DONE, BACK TO BED. PT POSITIONED ONTO PILLOWS UNDER COCCYX.
CALL IN HAND.

## 2022-05-02 NOTE — NUR
HEPARIN GTT RESTARTED  UNITS/HR AFTER HOLDING FOR 30 MINUTES. NEXT APTT
TO BE DRAWN AT 1400 AND ORDER PLACED PER PROTCOL. PT VISITING WITH HER
DAUGHTER AT THIS TIME. PT EATING HER FULL LIQUID DIET AND TOLERATING WELL.
STANDING WEIGHT 79.7 KG, (175 LBs), WHICH IS 28 LBS UP FROM ADMIT WEIGHT.
EDEMA IN LEGS IS STILL 3+, WITH EDEMA EXTENDING UP INTO UPPER THIGHS.
DISTENDING IN ABDOMEN IS SIMILAR TO YESTERDAY, AND PT STATES HER PAIN IS WELL
CONTROLLED. PLAN OF CARE DISCUSSED WITH PATIENT. CONTINUE TO MONITOR.

## 2022-05-02 NOTE — NUR
REPORT RECEIVED FROM DAY SHIFT RN. PT
SITTING IN RECLINER ALERT AND ORIENTED. DENIES NEEDS AT THIS TIME. WHITE
BOARD UPDATED. CALL LIGHT IN REACH.

## 2022-05-02 NOTE — NUR
Transfer from CCU. Heparin at 16 ml/hr. Rate unchanged. 14:00 aPtt was
83.5. Next ApTT at 6am. TPN and lipids infusing. Pt liked sitting in recliner.
R PICC line and R lower FA IV patent. ACHS.

## 2022-05-02 NOTE — NUR
PT AWAKE IN BED WATCHING TV. IV ABX INFUSING WNL. PRN FOR SLEEP ADMIN PER
EMAR. PT REPORTS SHE IS COMFORTABLE. NO NEEDS AT THIS TIME.

## 2022-05-02 NOTE — NUR
Contacted [facility].  They will not be here today. Let them know I will check
with the DrBree if pt is discharging today.

## 2022-05-02 NOTE — NUR
Medication is being filled for 1 time refill only due to:  Patient needs to be seen because it has been more than one year since last visit.     Nimco Barrera RN -- [facility]        PT SBA WITH FWW TO BR TO VOID AND HAVE SMALL LOOSE BM. AT SINK TO WASH HANDS.
BACK TO BED, FELIPE WELL. PT REPORTS DISCOMFORT IN BILAT FEET DUE TO EDEMA.
EVENING ASSESSMENT COMPLETE. PT DENIES PAIN OR NAUSEA. ABD FIRM AND DISTENDED.
BOWEL TONES ACTIVE. TPN/LIPIDS INFUSING WNL. HEPARIN DRIP INFUSING PER ORDER.
PT REQUESTING PRN FOR SLEEP. MD NOTIFIED AND WILL ADD ORDERS. SCD'S IN PLACE.
PT DENIES QUESTIONS OR CONCERNS. CALL LIGHT IN REACH.

## 2022-05-02 NOTE — NUR
PHYSICAL THERAPIST IN ROOM TO WORK WITH PT. PT ABLE TO DO LAPS AROUND UNIT
WITH WALKER. STATES THE "EXTRA FLUID" ON HER LEGS IS MAKING IT MORE DIFFICULT
TO DO ALL THE THERAPY SHE WOULD LIKE TO BE DOING. PT STEADY ON FEET TO
BATHROOM WITH LARGE, PALE YELLOW VOID, SMALL BM. PT NOW SITTING UP IN CHAIR,
WARM BLANKET PROVIDED.

## 2022-05-03 NOTE — NUR
SBA TO BR TO VOID 300 ML YELLOW URINE. GAIT STEADY. PT ABLE TO DO OWN EZRA
CARE. ALLEVYN PLACED TO COCCYX AREA DUE TO PT REPORT BEING PAINFUL. SKIN
INTACT. NO REDNESS NOTED. BACK TO BED, FELIPE WELL. EVENING ASSESSMENT COMPLETE.
SCHEDULED MEDS ADMIN PER EMAR. PT DENIES NAUSEA. PRN FOR COCCYX PAIN AND
SLEEP ADMIN PER EMAR. EDEMA IN UPPER AND LOWER EXTREMITIES NOTED. FINE
CRACKLES HEARD IN BILAT LOWER LUNG FIELDS. RA. SpO2 97%. PT DENIES SOB.
HEPARIN INFUSING AT 16ML/HR. TPN INFUSING PER ORDER. PICC LINE IN LEFT UPPER
ARM WNL. IV ABX INFUSING WNL. ABD SOFT WITH MILD DISTENTION. BOWEL
TONES ACTIVE. PT REPORTS FLATUS. ASSISTED PT TO REPOSITION TO LEFT SIDE OFF
COCCYX WITH PILLOWS. PT DENIES QUESTIONS OR CONCERNS. CALL LIGHT IN REACH.

## 2022-05-03 NOTE — NUR
PATIENT IN CHAIR WATCHING TV. VITALS AND I&O'S CHARTED. CALL LIGHT IN REACH.
NO FURTHER NEEDS AT THIS TIME.

## 2022-05-03 NOTE — NUR
Pt's BGs were elevated today (dinner BG was 311). Morning assessment revealed
b/l lower lungs crackles and persistent edema (especially in b/l lower
extremities). Pt received one time dose of Lasix IV (20mg).
TPN is to be tapered off. New bag was started at 4:30 pm at a new rate of 42
ml/hr. It is to be stopped at 4:30 am. Pt's appetite has been good.
Allevyn applied to coccyx since Pt c/o sore bottom. New cushion applied to
recliner.
pTT was 107.2 in am. No rate change. Next pTT tomorrow morning.

## 2022-05-03 NOTE — NUR
Pt denies needs. States she is tired and did not sleep well. Will ask the
nurses to put the "sleeping" sign on her door after lunch.  Let her know
cancelled her mamogram as requested. [place] was delivered today from [facility].

## 2022-05-03 NOTE — NUR
PT REMAINS IN RECLINER WITH FEET ELEVATED. VS AND I&O COMPLETE. PT DENIES PAIN
OR NAUSEA. LAB IN ROOM FOR MORNING DRAW. CALL LIGHT IN REACH.

## 2022-05-03 NOTE — NUR
CALL LIGHT ANSWERED. PT UP TO BR WITH SBA AND FWW WITH CNA. BACK TO RECLINER.
BLE ELEVATED. PT DENIES SOB. REPORTS CONTINUED DISCOMFORT UNDER LEFT BREAST.
REFUSES WHEN OFFERED PRN FOR PAIN. WARM COMPRESS IN PLACE, PT REPORTS RELIEF.
LIPID INFUSION COMPLETE. TPN AND HEPARIN INFUSING WNL. NO FURTHER NEEDS. CALL
LIGHT IN REACH.

## 2022-05-03 NOTE — NUR
PT COMPLAINED HARD TO BREATH.  INTO ROOM, WITH OTHER STAFF RN'S, RT.  PT
RUBBING UNDER HER LEFT BREAST, ALONG RIB CAGE AREA.  NO SOB NOTED, RA SATS 96.
ASK IF THIS WAS PRESSURE PAIN, NO THOUGHT CRAMP LIKE PAIN.  SAYS NOT A NEW
PAIN, SOMETIMES GOES ACROSS TOP STOMACH TO OTHER SIDE RIB CAGE.  THOUGHT
GETTING UP WOULD HELP.  UP TO RECLINER, FEET ELEVATED, WARM BLANKET PROVIDED,
WARM PACK UNDER LEFT RIB/BREAST AREA.  PRIMARY RN NOTIFIED.

## 2022-05-03 NOTE — NUR
PATIENT UP IN CHAIR AT THIS TIME. VITALS AND I&O'S CHARTED. FRESH WATER GIVEN.
CALL LIGHT IN REACH. NO FURTHER NEEDS AT THIS TIME.

## 2022-05-03 NOTE — NUR
CALL LIGHT ANSWERED. PT UP TO BR TO VOID 400 ML CONCENTRATED URINE AND HAVE
SMALL SEMI FORMED BM. PT ABLE TO DO OWN EZRA CARE. AT SINK TO WASH HANDS. BACK
TO BED, FELIPE WELL. GAIT STEADY. PT DENIES PAIN OR NAUSEA. SCD'S IN PLACE. NO
FURTHER NEEDS.

## 2022-05-03 NOTE — NUR
PT SITTING IN RECLINER. DENIES SOB AT THIS TIME. CRACKLES AUSCULTATED IN
BILAT LUNGS. SpO2 96% ON RA. HR 70'S. RESPIRATIONS EVEN. PT
REPORTS PAIN UNDER LEFT BREAST. FIRM LUMP UNDER BREAST PALPATED.
PT REPORTS WARM COMPRESS IMPROVING PAIN. WHEN ASKED PT BELIEVES MD AWARE OF
AREA CAUSING PAIN. FRESH WATER PROVIDED. PT AGREES SHE IS COMFORTABLE AT THIS
TIME. NO FURTHER NEEDS. CALL LIGHT IN REACH.

## 2022-05-03 NOTE — NUR
REPORT RECEIVED FROM DAY SHIFT RN. PT SITTING IN RECLINER RESTING WITH EYES
CLOSED. RESPIRATIONS EVEN. TPN AND HEPARIN INFUSING PER ORDER. WHITE BOARD
UPDATED. CALL LIGHT IN REACH.

## 2022-05-03 NOTE — NUR
PATIENT SITTING IN CHAIR WATCHING TV. VITALS AND I&O'S CHARTED. FRESH ICE
CHIPS GIVEN. CALL LIGHT IN REACH. NO FURTHER NEEDS AT THIS TIME.

## 2022-05-04 NOTE — CONS
[facility]
                                    [address], [place]  [address]
_________________________________________________________________________________________
                                                                 Signed   
 
 
DATE OF CONSULTATION:
04/21/2022
 
REQUESTING PHYSICIAN:
Dr. Aba MD
 
PROBLEM:
Portal vein and mesenteric thrombosis.
 
HISTORY OF PRESENT ILLNESS:
This 71-year-old Polish woman is known to me from the past.  She underwent a lumpectomy
with sentinel lymph node biopsy and radiation therapy for breast cancer in 2019, this
was in the left upper outer aspect.  Her breast cancer was ER/MI positive, and there was
no evidence of metastatic disease to the lymph nodes. 
 
The patient is admitted at this time in the intensive care unit under the direction of
Dr. Sue.  She presented to the emergency room yesterday, April 20, 2022, with diffuse
abdominal pain and evaluated by Dr. Burt.  Her laboratory studies showed an elevated
white count 12.6 with hematocrit of 44.1, platelets 136,000.  A CT scan showed
thickening of small-bowel, particularly in the ileum and portion of the right colon.  A
CT angiogram showed no evidence of arterial insufficiency or embolism.  She was
considered to have possible enteritis either infectious or inflammatory and
consideration for ischemic causes was made. She was considered to have encephalopathy
as well, related to acute illness with know autoimmune hepatitis and portal
hypertension.
 
On the basis of those findings, she was admitted rather by Dr. Sue at approximately
11:30 p.m.  As relates to her known autoimmune hepatitis, she is 
currently on azathioprine and
prednisone.  She is said to have had cirrhosis of the liver.  The CT scan findings
showed distal small-bowel inflammation and leukocytosis with an elevated ammonia level
of 70.  Lab studies were notable for creatinine of 1.16.  Liver enzymes,  were
normal.  Glucose of 147.  White count of 12.6, hematocrit 44.1, platelets 136,000.  Her
serum ammonia was 70 (normal to 32).  Lactic acid 1.8.  She was COVID negative. 
 
During the course of the day today, she was noted to have increasing abdominal pain and
on that basis, a CT scan was repeated approximately 03:45 p.m. today.  Unlike the CT
angiogram ( which she initially underwent as ordered by Dr Espinal, emergency room
doctor from yesterday) the conventional abdominal ct scan now reveals significant 
 findings of mesenteric thrombosis including the 
main portal vein, the left and right branches of the portal vein, and proximal portion
of the splenic vein as well as superior and inferior mesenteric veins and multiple
distal venous branches corresponding to the right lower abdomen.  The gallbladder showed
multiple calcified gallstones.  There is no ureteral or renal abnormality.  The colon
 
    Electronically Signed By: INOCENTE CORBETT MD  05/04/22 1140
_________________________________________________________________________________________
PATIENT NAME:     KIANA ARSHAD                    
MEDICAL RECORD #: [id number]            CONSULTATION                  
          ACCT #: [id number]  
DATE OF BIRTH:   [birth year]            REPORT #: [id number]      
PHYSICIAN:        INOCENTE CORBETT MD                 
PCP:              CAMILLA WATSON MD                
REPORT IS CONFIDENTIAL AND NOT TO BE RELEASED WITHOUT AUTHORIZATION
 
 
                                  [address]  [address]
_________________________________________________________________________________________
                                                                 Signed   
 
 
had a large amount of stool.  There was segmental mural thickening of several loops of
ileum within the right lower abdomen.  Mildly dilated loops of small-bowel proximally
without wall thickening.  There was increased mesenteric edema compared to previous CT
scan and inflammatory fat surrounding the abnormal small-bowel loops and wall thickening
along the right colon and cecum.  There is no evidence of aneurysm.  There was
considered to be increasing mild perihepatic ascites and some bilateral pericolic fluid.
 There is no evidence of bony lesion. 
 
After consultation, I recommended that the patient be immediately anticoagulated with 
 heparin 5000 units IV bolus and  to initiate a heparin drip for the mesenteric venous
thrombosis findings. 
 
The patient herself says she has been having symptoms for the past 4-5 days.  She had no
nausea or vomiting and no blood per rectum but vague and progressive abdominal pain. 
 
SOCIAL HISTORY:
She is accompanied by her sister at this time.  She tells me that her mother had some
sort of bowel resection for ischemic bowel, but with further discussion, likely this was
related to adhesions rather than the mesenteric thrombosis phenomenon. 
 
The patient was noted to have multiple esophageal and gastric varices and thought this
related to longstanding venous thrombosis.  The small-bowel was considered to have
ischemic ileitis and colitis related to underlying venous thrombosis. 
 
REVIEW OF SYSTEMS:
She denies any shortness of breath or chest pain.  She does have abdominal pain mostly
in the right lower abdomen.  She is not particularly hungry.  She is not nauseated. 
 
PHYSICAL EXAMINATION:
GENERAL:  Pleasant [place] woman, who looks somewhat icteric. 
NECK:  Shows no thyromegaly or cervical adenopathy.  Trachea is midline. 
CHEST:  Shows normal respiratory excursion.  She is not tachypneic. 
HEART:  Regular without murmur. 
ABDOMEN:  Obese, but soft.  There is definitely tenderness in the right lower to mid
abdomen. 
EXTREMITIES:  Show no clubbing, cyanosis, or edema.
 
LABORATORY STUDIES:
I see no liver enzymes on lab studies today.  Admission lab studies showed normal liver
enzymes and bilirubin elevated rather at 4.4.  Showed a lactic acid yesterday of 1.8
with an ammonia level of 70 (elevated normal up to 32).  Chem profile was normal.
Creatinine 1.16.  Liver enzymes as previously noted.  A white count of 12.6 was noted
yesterday with hematocrit 44.1, and platelets of __________.  I have reviewed her CT
scans in detail and there appears to be segmental thrombotic changes in the portal
 
    Electronically Signed By: INOCENTE CORBETT MD  05/04/22 7170
_________________________________________________________________________________________
PATIENT NAME:     KIANA ARSHAD                    
MEDICAL RECORD #: [id number]            CONSULTATION                  
          ACCT #: [id number]  
DATE OF BIRTH:   [birth year]            REPORT #: [id number]      
PHYSICIAN:        INOCENTE CORBETT MD                 
PCP:              CAMILLA WATSON MD                
REPORT IS CONFIDENTIAL AND NOT TO BE RELEASED WITHOUT AUTHORIZATION
 
 
                                  [facility]
                                    [address], [place]  [address]
_________________________________________________________________________________________
                                                                 Signed   
 
 
system.  There is definite swelling and increased diameter small-bowel loops and
possibly portion of the right colon. 
 
ASSESSMENT:
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
proximally within the small-bowel and notably corresponding to the terminal ileum quite
obviously.  She does not have signs currently of systemic toxicity and has been
initiated on antibiotic therapy already.  Her current regimen 
includes Flagyl intravenously administered.  Other medicines include:
1. Protonix.
2. Systemic anticoagulation with heparin intravenously.
3. Morphine.
4. Prednisone 10 mg p.o. daily.
5. Levaquin 750 mg p.o. daily.
6. Enulose (lactulose).
7. Imuran (azathioprine).
8. Arimidex (anastrozole).
9. Insulin as well as Protonix and rifaximin.  
The thickening and tenderness associated with the bowel loops in the right abdomen are
worrisome for ischemic change.  She is not known to have had portal venous thrombosis in
the past, but note is made that she does have esophageal varices indicative of
long-standing portal hypertension. 
 
Although, there are controversies regarding anticoagulation in the face of variceal
changes, i t is to be remembered that heparin does not induce bleeding, it only
facilitates it if it is initiated. 
 
I believe the risks of her venous thrombosis of her mesenteric vessels and portal vein
warrant the added hazard with underlying portal venous hypertension.  A bolus of 5000
units of heparin has been initiated and a drip according to a nomogram to the pharmacy
was also initiated. 
 
I have additionally asked that a lactic acid level be repeated since the one yesterday
was normal and she has ongoing and somewhat unremitting right abdominal pain.  Also
recommended C-reactive protein be obtained. 
 
The question of course remains whether operative intervention would be appropriate.  In
mesenteric venous thrombosis disease, the primary issue is establishment of
anticoagulation, collateralization of the blood vessels, and recanalization of the
portal vein and its tributaries as much as possible.  Surgical intervention is always a
consideration for those, who may have ischemic bowel for which resectional therapy would
be indicated. 
 
We will assess her lab studies and I have discussed with the patient and her sister the
 
    Electronically Signed By: INOCENTE CORBETT MD  05/04/22 1140
_________________________________________________________________________________________
PATIENT NAME:     KIANA ARSHAD                    
MEDICAL RECORD #: [id number]            CONSULTATION                  
          ACCT #: [id number]  
DATE OF BIRTH:   [birth year]            REPORT #: [id number]      
PHYSICIAN:        INOCENTE CORBETT MD                 
PCP:              CAMILLA WATSON MD                
REPORT IS CONFIDENTIAL AND NOT TO BE RELEASED WITHOUT AUTHORIZATION
 
 
                                  [facility]
                                    [address] [place] [address]  [address]
_________________________________________________________________________________________
                                                                 Signed   
 
 
grave nature of her problem.  Her multiple underlying problems including autoimmune
hepatitis with resultant cirrhosis and portal hypertension and now mesenteric venous
thrombosis in the phase of an underlying previous malignancy (breast cancer) portends a
generally poor prognosis.  On the other hand, I do believe her situation is salvageable
with appropriate anticoagulation and if necessary, operative intervention for
resectional therapy depending on level of ischemia of the bowel related to the
mesenteric venous hypertension. 
 
The patient can reliably be noted to need additional fluid resuscitation as bowel wall
edema can become considerable.  She does have some intra-abdominal ascites already and
it appears more today on today's scan than at her admission CT scan. 
 
I will discuss all this with Dr. Sue further.  I remain constantly available should
operative intervention be required. I will continue to assist in her ongoing management.
 
 
 
 
 
            ________________________________________
            Inocente Corbett MD 
 
 
/MODL
Job #:  [id number]
DD:  04/21/2022 19:37:31
DT:  04/21/2022 22:14:46
 
cc:            [facility] 
 
               Carly Sue MD
 
 
Copies:  CARLY SUE MD
~
 
 
 
 
 
 
 
 
 
 
    Electronically Signed By: INOCENTE CORBETT MD  05/04/22 1140
_________________________________________________________________________________________
PATIENT NAME:     KIANA ARSHAD                    
MEDICAL RECORD #: [id number]            CONSULTATION                  
          ACCT #: [id number]  
DATE OF BIRTH:   [birth year]            REPORT #: [id number]      
PHYSICIAN:        INOCENTE CORBETT MD                 
PCP:              CAMILLA WATSON MD                
REPORT IS CONFIDENTIAL AND NOT TO BE RELEASED WITHOUT AUTHORIZATION

## 2022-05-04 NOTE — NUR
IV ABX INFUSING WNL. PT DENIES PAIN OR NAUSEA. MORNING LABS DRAWN PER
PROTOCOL. ASSISTED PT TO REPOSITION TO RIGHT SIDE WITH PILLOWS. NO FURTHER
NEEDS. CALL LIGHT IN REACH.

## 2022-05-04 NOTE — NUR
PT IS UP IN CHAIR WATCHING TV. I&O AND VS CHARTED. CALL LIGHT WITHIN REACH. NO
FURTHER TASKS AT THIS TIME

## 2022-05-04 NOTE — NUR
PT BACK TO BED, VS TAKEN, ACCU CHECK AT THIS TIME, PILLOWS IN PLACE FOR PT,
WARM BLANKET PROVIDED, NO FURTHER NEEDS AT THIS TIME

## 2022-05-04 NOTE — NUR
IN TO DO ASSESSMENT AND HS MEDS. PT HAS JUST GOTTEN BACK TO BED FROM BATHROOM
WITH ASSIST BY CNA. PT DENIES ABDOMINAL PAIN, PT REPORTS "LOTS OF BOWEL
MOVEMENTS". HEPARIN GTT INFUSING THROUGH PICC AT 800UNITS/HR. LUNGS HAVE
CRACKLES IN BASES AND PT WAS SLIGHTLY SHORT OF BREATH AFTER WALKING BACK FROM
BATHROOM BUT RECOVERS QUICKLY. PT IS ALERT AND ORIENTED, HAS CALL LIGHT IN
HAND AND STATES SHE WILL CALL WITH NEEDS.

## 2022-05-04 NOTE — NUR
ARSLAN IS UP IN CHAIR. I&O AND VITALS CHARTED. CALL LIGHT WITHIN REACH. NO
FURTHER TASKS AT THIS TIME

## 2022-05-04 NOTE — NUR
PATIENT WENT TO THE BATHROOM. SBA. PATIENT IS BACK IN BED. V/S AND I&O'S TAKEN
AND CHARTED. NO OTHER NEEDS AT THIS TIME. CALL LIGHT WITHIN REACH.

## 2022-05-04 NOTE — NUR
Pt looked rested today. Spent all day in recliner except for bathroom breaks
and PT session. Abx discontinued. Appetite improving. Pt received 2.5mg of
Coumadin. pTT was 73.5 this am- no heparin drip rate change. The
plan is to tentatively d/c heparin gtt tomorrow providing INR is
2.5. Pt continues to have b/l lower lungs crackles - one dose of 20 mg Lasix
given. But overall edema is slowly decreasing.
PICC line dressing changed. BM x 1 today.

## 2022-05-04 NOTE — NUR
PT SITTING IN CHAIR-TV ON. PT PLEASANT, NOT HEALING AS FAST AS SHE WOULD LIKE
BUT GETTING THERE. GAVE BLESSING. WILL FOLLOW

## 2022-05-04 NOTE — NUR
During shift handoff Pt was sleeping in bed. Turned to R side. Around 8 am Pt
requested to sit in a recliner to get ready for breakfast. She stated she
slept pretty well and felt rested.
TPNs discontinued.
BG was 91 this am. R wrist IV discontinued since it was sluggish and burning.
Heparin at 16 ml/hr. pTT was 73.5. No change in imfusion rate. Next ptt
tomorrow morning. INR was 2.09. Pt was ordered one time dose of lasix IV (20
mg). Overall edema is slowly decreasing. Crackles at b/l lower lungs persist.
Pt did report breathing better after dose of Lasix.

## 2022-05-04 NOTE — NUR
TPN INFUSION DC'D PER ORDER. PT RESTING ON RIGHT SIDE. RESPIRATIONS EVEN.
HEPARIN DRIP INFUSING WNL. CALL LIGHT IN REACH.

## 2022-05-04 NOTE — NUR
Spoke with pt and her daughter, Sariah.  Pt states she spoke with Dr. Marin
and she will be here 1-2 more days.  She denies need.  Daughter is working on
replacing pts bathroom to be ada accessable.

## 2022-05-04 NOTE — NUR
PATIENT SITTING IN RECLINER LOOKING OUT THE WINDOW. I GAVE HER A HANDOUT ON
DIET WHILE TAKING COUMADIN/WARFARIN. SHE DOESN'T EAT THE HIGH VITAMIN K
VEGGIES REGULARLY, THOUGH HER DAUGHTER DOES SPRINKLE CILANTRO OR PARSLEY ON
SOME MEALS. THE PARSLEY OR CILANTRO AREN'T A PROBLEM UNTIL SHE EATS 1/4 CUP OR
MORE AT A SITTING. PATIENT DOES LIKE BROCCOLI AND EMILY LETTUCE SALADS. I
EXPLAINED THAT IT'S BEST TO HAVE ONLY ONE SERVING OF ONE OF THOSE PER DAY AS
INDICATED ON THE HANDOUT. SHE MENTIONED SHE WAS TAKING A MVI/MIN SUPPLEMENT
FOR WOMEN OVER 50. AS LONG AS SHE WAS TAKING IT REGULARLY SHE CAN CONTINUE TO
TAKE IT, BUT I REMINDED HER NOT TO START TAKING A NEW OR DIFFERENT MVI/MIN
SUPPLEMENT THAT CONTAINS VITAMIN K. WILL PROVIDE ANY OTHER EDUCATION DEPENDING
ON DIET ORDER AT DISCHARGE. Scc Poorly Differentiated Histology Text: There are irregular masses of epidermal cells in the upper dermis.  Within the tumor masses, there are varying proportions of normal squamous cells and anaplastic squamous cells.  The anaplastic cells have variation in size and shape with hyperplasia and hyperchromasia of the nuclei, absence of intracellular bridges and varying degrees of keratinization.  A poorly differentiated pattern is noted.

## 2022-05-04 NOTE — NUR
CALL LIGHT ANSWERED. IN TO ASSIST PT TO REPOSITION TO LEFT SIDE WITH PILLOWS.
PT REPORTS SHE IS RESTING WELL. ASSESSMENT COMPLETE. DENIES NEEDS. CALL LIGHT
IN REACH.

## 2022-05-05 NOTE — NUR
RN IN ROOM TO ASSESS PT. PT SITTING UP IN CHAIR PERFOMRING ADLS INDEPENDENTLY
UPON ENTERING.
PT DENIES PAIN AND NAUSEA - STATES SHE ONLY HAS PAIN WITH GAS "SOMETIMES". PT
WORKED WITH PT EARLIER THIS AM, STATES SHE HAD SOME SOB. ASSESSMENT OTHERWISE
UNCHANGED FROM PREVIOUS SHIFT.
CALL LIGHT IN REACH.

## 2022-05-05 NOTE — NUR
RN IN ROOM TO ADMINISTER SCHEDULED MEDICATIONS. PT SITTING UP IN CHAIR EATING
BREAKFAST UPON ENTRY.
PT DENIES PAIN OR COMPLAINTS AT THIS TIME. INSULIN HELD THIS AM FOR CBG WNL.
HEPARIN DRIP DC'D AS INR WAS WITHIN THERAPEUTIC RANGE. CHARGE RN NOTIFIED MD.
0700 DOSE OF PROTONIX FOUND AT COMPUTER IN ROOM FROM NIGHT RN. WAS NOT
ADMINISTERED OR SCANED. RETURNED TO [facility].
PICC LINE HEP LOCED WITHOUT DIFFICULTY.
PT DENIES FURTHER NEEDS AT THIS TIME. CALL LIGHT IN REACH.

## 2022-05-05 NOTE — NUR
RN IN ROOM TO ADMINISTER SCHEDULED MEDICATIONS. PT SITTING IN CHAIR EATING
DINNER.
DENIES FURTHER NEEDS.
CNA IN ROOM TO COMPLETE VS.

## 2022-05-05 NOTE — NUR
RN IN ROOM TO ASSESS PT. PT CONTINUING TO SIT UP IN CHAIR AND LOOK OUT WINDOW.
PT DENIES PAIN AND NAUSEA. ASSESSMENT UNCHANGED FROM AM. VS STABLE. PT EXCITED
ABOUT POSSIBILITY OF DC TO HOME IN NEAR FUTURE.
CALL LIGHT IN REACH.

## 2022-05-05 NOTE — NUR
RN IN ROOM TO ASSIST PT TO BATHROOM. PT STEADY ON FEET AS STANDBY ASSIST BUT
SOB NOTED WITH ACTIVITY.
PT BACK TO CHAIR, CALL LIGHT IN REACH.
PT DENIES NEEDS AT THIS TIME.

## 2022-05-05 NOTE — NUR
RN IN ROOM TO ADMINISTER SCHEDULED MEDICATIONS. PT BACK TO CHAIR FROM
BATHROOM, AMBULATING IN ROOM AS STANDBY ONLY, STRONG AND STEADY ON FEET - SOME
DIZZINESS WITH TOO MUCH AMBULATION.
PT DENIES ANY NEEDS AT THIS TIME. CALL LIGHT IN REACH.

## 2022-05-05 NOTE — NUR
PT RESTING IN CHAIR, EYES CLOSED, RR EVEN AND UNLABORED.
PT HAD A UNEVENTFUL DAY, VS STABLE, AMBULATING IN ROOM WELL. INR THERAPEUTIC,
HEP DRIP DC'D. VOIDING QS. PLAN TO DC HOME TOMORROW IF INR REMAINS IN RANGE.

## 2022-05-05 NOTE — NUR
PT STATES SHE SLEPT WELL THROUGH THE NIGHT. IS CURRENTLY SITTING UP IN CHAIR
AND BRUSHING HAIR. UNEVENTFUL NIGHT.

## 2022-05-05 NOTE — NUR
report received from night rn no - pt sitting up in chair, denies needs at
this time. call light in reach.

## 2022-05-06 NOTE — NUR
REPORT RECEIVED FROM NIGHT RN HUDSON- PT RESTING IN BED AWAKE. DENIES NEEDS AT
THIS TIME, IS ANXIOUS AT POSSIBILTIY OF DISCHARGE TODAY.

## 2022-05-06 NOTE — NUR
PT SITTING IN CHAIR, GAZING OUT WINDOW EATING FRUIT AND SEEMS TO BE HAVING AN
ENJOYABLE MOMENT. PT HOPES TO DC TODAY, THANKED ME FOR STOPPING IN. GAVE HER
A BLESSING. WILL FOLLOW

## 2022-05-06 NOTE — NUR
CALL LIGHT ON. pt UP SBA FWW TO TOILET. SOME INCONTINENCE, LARGE VOID,
CONCENTRATED URINE. BACK TO BED. NO FURTHER REQUESTS AT THIS TIME. CALL LIGHT
WITHIN REACH.

## 2022-05-06 NOTE — NUR
RN IN ROOM TO PREPARE PT FOR DC TO HOME.
PICC LINE REMOVED WITHOUT COMPLICATIONS. PRESSURE DRESSING APPLIED. PT
TOLERATED IT WITHOUT PAIN OR DIFFICULTY.
SCHEDULED MEDICATIONS ADMINISTERD.
PT WAITING ON DAUGHTER TO ARRIVE.

## 2022-05-06 NOTE — NUR
RN IN ROOM TO ADMINISTER SCHEDULED MEDICATIONS. PT UP IN CHAIR UPON ENTERY.
PT DENIES PAIN.
PICC HEP LOCKED - DRESSING CHANGE NOT INDICATED.
VS STABLE. PT DENIES FURTHER NEEDS, CALL LIGHT IN REACH.

## 2022-05-06 NOTE — NUR
RN IN ROOM TO ASSESS PT - PT SITTING IN CHAIR READING INSTRUCTIONS ON WARFARIN
MEDICATION CARE PROVIDED BY PHARMACIST.
PT DENIES PAIN OR ANY OTHER CONCERNS.

## 2022-05-06 NOTE — NUR
RN IN ROOM TO ASSESS PT. PT SITTING UP IN CHAIR.
DENIES PAIN OR ANY CONCERNS AT THIS TIME.
ASSESSMENT WNL - UNCHANGED FROM PREVIOUS ASSESSMENT. VS STABLE.
CALL LIGHT IN REACH.
 
****[facility]
NOT ALLOWING ENTRY OF PT ASSESSMENT INTERVENTION TO BE DOCUMENTED, CALLING
HELPLINE TO RESOLVE.
****

## 2022-05-06 NOTE — NUR
Pt has had an uneventful night. Appears to have slept well. Uses call light
appropriately however will call and then start getting up before staff
arrives.

## 2022-07-07 ENCOUNTER — HOSPITAL ENCOUNTER (EMERGENCY)
Dept: HOSPITAL 46 - ED | Age: 72
Discharge: HOME | End: 2022-07-07
Payer: MEDICARE

## 2022-07-07 VITALS — WEIGHT: 175 LBS | BODY MASS INDEX: 32.2 KG/M2 | HEIGHT: 62 IN

## 2022-07-07 DIAGNOSIS — N18.6: ICD-10-CM

## 2022-07-07 DIAGNOSIS — Z79.01: ICD-10-CM

## 2022-07-07 DIAGNOSIS — Z79.52: ICD-10-CM

## 2022-07-07 DIAGNOSIS — Z85.3: ICD-10-CM

## 2022-07-07 DIAGNOSIS — Z79.899: ICD-10-CM
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
## 2022-07-07 DIAGNOSIS — M79.604: ICD-10-CM

## 2022-07-07 DIAGNOSIS — S70.11XA: Primary | ICD-10-CM

## 2022-07-07 DIAGNOSIS — D68.9: ICD-10-CM

## 2022-07-07 DIAGNOSIS — F17.200: ICD-10-CM

## 2022-07-07 DIAGNOSIS — E11.22: ICD-10-CM

## 2022-07-09 ENCOUNTER — HOSPITAL ENCOUNTER (EMERGENCY)
Dept: HOSPITAL 46 - ED | Age: 72
Discharge: HOME | End: 2022-07-09
Payer: MEDICARE

## 2022-07-09 VITALS — HEIGHT: 62 IN | WEIGHT: 175.38 LBS | BODY MASS INDEX: 32.27 KG/M2

## 2022-07-09 DIAGNOSIS — F17.200: ICD-10-CM

## 2022-07-09 DIAGNOSIS — Z79.01: ICD-10-CM

## 2022-07-09 DIAGNOSIS — M79.81: Primary | ICD-10-CM

## 2022-07-09 DIAGNOSIS — N18.6: ICD-10-CM

## 2022-07-09 DIAGNOSIS — E11.22: ICD-10-CM

## 2022-07-09 DIAGNOSIS — Z79.899: ICD-10-CM

## 2022-07-09 DIAGNOSIS — D64.9: ICD-10-CM

## 2022-07-09 NOTE — XMS
PreManage Notification: KIANA ARSHAD MRN:[id number]
 
Security Information
 
Security Events
No recent Security Events currently on file
 
 
 
CRITERIA MET
------------
- [facility] - 2 Visits in 30 Days
 
 
CARE PROVIDERS
There are no care providers on record at this time.
 
[facility] has no Care Guidelines for this patient.
 
HERVE VISIT COUNT (12 MO.)
-------------------------------------------------------------------------------------
3 [facility]
-------------------------------------------------------------------------------------
TOTAL 3
-------------------------------------------------------------------------------------
NOTE: Visits indicate total known visits.
 
ED/C VISIT TRACKING (12 MO.)
-------------------------------------------------------------------------------------
07/09/2022 06:49
[facility] [place]
 
TYPE: Emergency
 
COMPLAINT:
- R HIP PAIN
-------------------------------------------------------------------------------------
07/07/2022 12:57
[facility]
 
TYPE: Emergency
 
COMPLAINT:
- BRUISED R HIP, PAIN
-------------------------------------------------------------------------------------
04/20/2022 15:23
[facility] [place]
 
TYPE: Emergency
 
COMPLAINT:
- ABDOMINAL PAIN
-------------------------------------------------------------------------------------
 
 
INPATIENT VISIT TRACKING (12 MO.)
-------------------------------------------------------------------------------------
04/20/2022 22:43
[facility] [place]
 
 
TYPE: Medical Surgical
 
COMPLAINT:
- SEPSIS, ENCEPHALOPATHY
 
DIAGNOSES:
- Nicotine dependence, unspecified, uncomplicated
- Acute (reversible) ischemia of small intestine, extent unspecified
- Other long term (current) drug therapy
- Unspecified cirrhosis of liver
- Long term (current) use of insulin
- Type 2 diabetes mellitus without complications
- Contact with and (suspected) exposure to COVID-19
- Acquired absence of other organs
- Ileus, unspecified
- Autoimmune hepatitis
- Long term (current) use of insulin
- Long term (current) use of aspirin
- Nicotine dependence, unspecified, uncomplicated
- Portal vein thrombosis
- Systemic inflammatory response syndrome (SIRS) of non-infectious origin without
acute organ dysfunction
- Personal history of malignant neoplasm of breast
- Unspecified cirrhosis of liver
- Other long term (current) drug therapy
- Acute (reversible) ischemia of small intestine, extent unspecified
- Acquired absence of other organs
- Portal hypertension
- Personal history of malignant neoplasm of breast
- Portal vein thrombosis
- Type 2 diabetes mellitus without complications
- Long term (current) use of aspirin
- Systemic inflammatory response syndrome (SIRS) of non-infectious origin without
acute organ dysfunction
- Contact with and (suspected) exposure to COVID-19
- Acute (reversible) ischemia of large intestine, extent unspecified
- Sepsis, unspecified organism
- Acute (reversible) ischemia of large intestine, extent unspecified
- Ileus, unspecified
- Portal hypertension
- Autoimmune hepatitis
-------------------------------------------------------------------------------------
 
[email or web address]

## 2022-07-15 ENCOUNTER — HOSPITAL ENCOUNTER (EMERGENCY)
Dept: HOSPITAL 46 - ED | Age: 72
Discharge: HOME | End: 2022-07-15
Payer: MEDICARE

## 2022-07-15 VITALS — BODY MASS INDEX: 27.83 KG/M2 | WEIGHT: 151.24 LBS | HEIGHT: 62 IN

## 2022-07-15 DIAGNOSIS — F17.200: ICD-10-CM

## 2022-07-15 DIAGNOSIS — D68.32: Primary | ICD-10-CM

## 2022-07-15 DIAGNOSIS — E11.22: ICD-10-CM

## 2022-07-15 DIAGNOSIS — Z79.899: ICD-10-CM

## 2022-07-15 DIAGNOSIS — K76.9: ICD-10-CM

## 2022-07-15 DIAGNOSIS — N18.6: ICD-10-CM

## 2022-07-15 NOTE — XMS
PreManage Notification: KIANA ARSHAD MRN:[id number]
 
Security Information
 
Security Events
No recent Security Events currently on file
 
 
 
CRITERIA MET
------------
- [facility] - 2 Visits in 30 Days
 
 
CARE PROVIDERS
There are no care providers on record at this time.
 
[facility] has no Care Guidelines for this patient.
 
HERVE VISIT COUNT (12 MO.)
-------------------------------------------------------------------------------------
4 [facility]
-------------------------------------------------------------------------------------
TOTAL 4
-------------------------------------------------------------------------------------
NOTE: Visits indicate total known visits.
 
ED/C VISIT TRACKING (12 MO.)
-------------------------------------------------------------------------------------
07/15/2022 14:25
[facility] [place]
 
TYPE: Emergency
 
COMPLAINT:
- ABNORMAL LABS
-------------------------------------------------------------------------------------
07/09/2022 06:49
[facility]
 
TYPE: Emergency
 
COMPLAINT:
- R HIP PAIN
 
DIAGNOSES:
- End stage renal disease
- Pain in right hip
- Long term (current) use of anticoagulants
- Other long term (current) drug therapy
- Nontraumatic hematoma of soft tissue
- Nicotine dependence, unspecified, uncomplicated
- Type 2 diabetes mellitus with diabetic chronic kidney disease
- Anemia, unspecified
-------------------------------------------------------------------------------------
07/07/2022 12:57
[facility] [place]
 
TYPE: Emergency
 
 
COMPLAINT:
- BRUISED R HIP, PAIN
 
DIAGNOSES:
- Contusion of right thigh, initial encounter
- Long term (current) use of systemic steroids
- Coagulation defect, unspecified
- Personal history of malignant neoplasm of breast
- Type 2 diabetes mellitus with diabetic chronic kidney disease
- Nicotine dependence, unspecified, uncomplicated
- Long term (current) use of anticoagulants
- End stage renal disease
- Exposure to other specified factors, initial encounter
- Other long term (current) drug therapy
-------------------------------------------------------------------------------------
04/20/2022 15:23
[facility] [place]
 
TYPE: Emergency
 
COMPLAINT:
- ABDOMINAL PAIN
-------------------------------------------------------------------------------------
 
 
INPATIENT VISIT TRACKING (12 MO.)
-------------------------------------------------------------------------------------
04/20/2022 22:43
[facility] [place]
 
TYPE: Medical Surgical
 
COMPLAINT:
- SEPSIS, ENCEPHALOPATHY
 
DIAGNOSES:
- Nicotine dependence, unspecified, uncomplicated
- Acute (reversible) ischemia of small intestine, extent unspecified
- Other long term (current) drug therapy
- Unspecified cirrhosis of liver
- Long term (current) use of insulin
- Type 2 diabetes mellitus without complications
- Contact with and (suspected) exposure to COVID-19
- Acquired absence of other organs
- Ileus, unspecified
- Autoimmune hepatitis
- Long term (current) use of insulin
- Long term (current) use of aspirin
- Nicotine dependence, unspecified, uncomplicated
- Portal vein thrombosis
- Systemic inflammatory response syndrome (SIRS) of non-infectious origin without
acute organ dysfunction
- Personal history of malignant neoplasm of breast
- Unspecified cirrhosis of liver
- Other long term (current) drug therapy
- Acute (reversible) ischemia of small intestine, extent unspecified
- Acquired absence of other organs
- Portal hypertension
- Personal history of malignant neoplasm of breast
 
- Portal vein thrombosis
- Type 2 diabetes mellitus without complications
- Long term (current) use of aspirin
- Systemic inflammatory response syndrome (SIRS) of non-infectious origin without
acute organ dysfunction
- Contact with and (suspected) exposure to COVID-19
- Acute (reversible) ischemia of large intestine, extent unspecified
- Sepsis, unspecified organism
- Acute (reversible) ischemia of large intestine, extent unspecified
- Ileus, unspecified
- Portal hypertension
- Autoimmune hepatitis
-------------------------------------------------------------------------------------
 
[email or web address]

## 2022-08-12 ENCOUNTER — HOSPITAL ENCOUNTER (EMERGENCY)
Dept: HOSPITAL 46 - ED | Age: 72
Discharge: ANOTHER HOSPITAL | End: 2022-08-12
Payer: MEDICARE

## 2022-08-12 VITALS — WEIGHT: 151 LBS | HEIGHT: 62 IN | BODY MASS INDEX: 27.79 KG/M2

## 2022-08-12 DIAGNOSIS — Z20.822: ICD-10-CM

## 2022-08-12 DIAGNOSIS — E11.22: ICD-10-CM

## 2022-08-12 DIAGNOSIS — N18.6: ICD-10-CM

## 2022-08-12 DIAGNOSIS — K76.7: ICD-10-CM

## 2022-08-12 DIAGNOSIS — K72.90: Primary | ICD-10-CM

## 2022-08-12 DIAGNOSIS — Z79.899: ICD-10-CM

## 2022-08-12 DIAGNOSIS — F17.200: ICD-10-CM

## 2022-08-12 PROCEDURE — C9803 HOPD COVID-19 SPEC COLLECT: HCPCS

## 2022-08-12 PROCEDURE — U0003 INFECTIOUS AGENT DETECTION BY NUCLEIC ACID (DNA OR RNA); SEVERE ACUTE RESPIRATORY SYNDROME CORONAVIRUS 2 (SARS-COV-2) (CORONAVIRUS DISEASE [COVID-19]), AMPLIFIED PROBE TECHNIQUE, MAKING USE OF HIGH THROUGHPUT TECHNOLOGIES AS DESCRIBED BY CMS-2020-01-R: HCPCS

## 2022-08-12 NOTE — XMS
PreManage Notification: KIANA ARSHAD MRN:[id number]
 
Security Information
 
Security Events
No recent Security Events currently on file
 
 
 
CRITERIA MET
------------
- [facility] - 2 Visits in 30 Days
 
 
CARE PROVIDERS
There are no care providers on record at this time.
 
[facility] has no Care Guidelines for this patient.
 
HERVE VISIT COUNT (12 MO.)
-------------------------------------------------------------------------------------
[facility]
-------------------------------------------------------------------------------------
TOTAL 5
-------------------------------------------------------------------------------------
NOTE: Visits indicate total known visits.
 
ED/C VISIT TRACKING (12 MO.)
-------------------------------------------------------------------------------------
[facility] 08[facility]
 
[facility]: [facility]
 
COMPLAINT:
- LETHARY, WEAKNESS, CONFUSION
-------------------------------------------------------------------------------------
07/15/2022 14:25
[facility] [place]
 
TYPE: Emergency
 
COMPLAINT:
- ABNORMAL LABS
 
DIAGNOSES:
- Other long term (current) drug therapy
- Lower abdominal pain, unspecified
- Liver disease, unspecified
- End stage renal disease
- Hemorrhagic disorder due to extrinsic circulating anticoagulants
- Nicotine dependence, unspecified, uncomplicated
- Type 2 diabetes mellitus with diabetic chronic kidney disease
-------------------------------------------------------------------------------------
07/09/2022 06:49
[facility] [place]
 
TYPE: Emergency
 
 
COMPLAINT:
- R HIP PAIN
 
DIAGNOSES:
- End stage renal disease
- Pain in right hip
- Long term (current) use of anticoagulants
- Other long term (current) drug therapy
- Nontraumatic hematoma of soft tissue
- Nicotine dependence, unspecified, uncomplicated
- Type 2 diabetes mellitus with diabetic chronic kidney disease
- Anemia, unspecified
-------------------------------------------------------------------------------------
07/07/2022 12:57
[facility] [place]
 
TYPE: Emergency
 
COMPLAINT:
- BRUISED R HIP, PAIN
 
DIAGNOSES:
- Contusion of right thigh, initial encounter
- Long term (current) use of systemic steroids
- Coagulation defect, unspecified
- Pain in right leg
- Personal history of malignant neoplasm of breast
- Type 2 diabetes mellitus with diabetic chronic kidney disease
- Nicotine dependence, unspecified, uncomplicated
- Long term (current) use of anticoagulants
- End stage renal disease
- Exposure to other specified factors, initial encounter
- Other long term (current) drug therapy
-------------------------------------------------------------------------------------
04/20/2022 15:23
[facility] [place]
 
TYPE: Emergency
 
COMPLAINT:
- ABDOMINAL PAIN
-------------------------------------------------------------------------------------
 
 
INPATIENT VISIT TRACKING (12 MO.)
-------------------------------------------------------------------------------------
04/20/2022 22:43
[facility] [place]
 
TYPE: Medical Surgical
 
COMPLAINT:
- SEPSIS, ENCEPHALOPATHY
 
DIAGNOSES:
- Nicotine dependence, unspecified, uncomplicated
- Acute (reversible) ischemia of small intestine, extent unspecified
- Other long term (current) drug therapy
- Unspecified cirrhosis of liver
- Long term (current) use of insulin
 
- Type 2 diabetes mellitus without complications
- Contact with and (suspected) exposure to COVID-19
- Acquired absence of other organs
- Ileus, unspecified
- Autoimmune hepatitis
- Long term (current) use of insulin
- Long term (current) use of aspirin
- Nicotine dependence, unspecified, uncomplicated
- Portal vein thrombosis
- Systemic inflammatory response syndrome (SIRS) of non-infectious origin without
acute organ dysfunction
- Personal history of malignant neoplasm of breast
- Unspecified cirrhosis of liver
- Other long term (current) drug therapy
- Acute (reversible) ischemia of small intestine, extent unspecified
- Acquired absence of other organs
- Portal hypertension
- Personal history of malignant neoplasm of breast
- Portal vein thrombosis
- Type 2 diabetes mellitus without complications
- Long term (current) use of aspirin
- Systemic inflammatory response syndrome (SIRS) of non-infectious origin without
acute organ dysfunction
- Contact with and (suspected) exposure to COVID-19
- Acute (reversible) ischemia of large intestine, extent unspecified
- Sepsis, unspecified organism
- Acute (reversible) ischemia of large intestine, extent unspecified
- Ileus, unspecified
- Portal hypertension
- Autoimmune hepatitis
-------------------------------------------------------------------------------------
 
[email or web address]

## 2022-08-14 NOTE — EKG
[facility]
                                    [address]
                                  [place]  [address]
_________________________________________________________________________________________
                                                                 Signed   
 
 
Normal sinus rhythm
Nonspecific T wave abnormality
Prolonged QT
Abnormal ECG
When compared with ECG of 20-APR-2022 17:09,
Vent. rate has decreased BY  38 BPM
ST no longer depressed in Lateral leads
Nonspecific T wave abnormality has replaced inverted T waves in Inferior leads
Nonspecific T wave abnormality has replaced inverted T waves in Lateral leads
Confirmed by GRETCHEN QUIROS MD ([id number]) on 8/14/2022 7:09:51 AM
 
 
 
 
 
 
 
 
 
 
 
 
 
 
 
 
 
 
 
 
 
 
 
 
 
 
 
 
 
 
 
 
 
 
 
    Electronically Signed By: GRETCHEN QUIROS MD  08/14/22 0710
_________________________________________________________________________________________
PATIENT NAME:     KIANA ARSHAD                    
MEDICAL RECORD #: [id number]                     Electrocardiogram             
          ACCT #: [id number]  
DATE OF BIRTH:   [birth year]                                       
PHYSICIAN:   GRETCHEN QUIROS MD                   REPORT #: [id number]
REPORT IS CONFIDENTIAL AND NOT TO BE RELEASED WITHOUT AUTHORIZATION

## 2022-11-12 NOTE — NUR
[facility] tx initiated, running alarm free. Dressing changed, site noted to have dried blood around catheter. Endorsed to primary RN.   Prior Authorization Retail Medication Request    Medication/Dose: tretinoin (RETIN-A) 0.1 % external cream  ICD code (if different than what is on RX):  Facial rhytids [L98.8]   Previously Tried and Failed:    Rationale:      Insurance Name:  [facility]  Insurance ID:  [id number]   Spoke with pt, walker arrived. Cancelled mamagram as requested.  Pt plans on
dc to home with daughter when cleared medically.